# Patient Record
Sex: FEMALE | Race: WHITE | NOT HISPANIC OR LATINO | URBAN - METROPOLITAN AREA
[De-identification: names, ages, dates, MRNs, and addresses within clinical notes are randomized per-mention and may not be internally consistent; named-entity substitution may affect disease eponyms.]

---

## 2020-10-02 ENCOUNTER — EMERGENCY (EMERGENCY)
Facility: HOSPITAL | Age: 45
LOS: 0 days | Discharge: HOME | End: 2020-10-02
Payer: COMMERCIAL

## 2020-10-02 VITALS — OXYGEN SATURATION: 95 %

## 2020-10-02 VITALS
RESPIRATION RATE: 18 BRPM | HEART RATE: 89 BPM | TEMPERATURE: 99 F | OXYGEN SATURATION: 93 % | SYSTOLIC BLOOD PRESSURE: 146 MMHG | DIASTOLIC BLOOD PRESSURE: 88 MMHG

## 2020-10-02 DIAGNOSIS — R09.81 NASAL CONGESTION: ICD-10-CM

## 2020-10-02 DIAGNOSIS — R09.89 OTHER SPECIFIED SYMPTOMS AND SIGNS INVOLVING THE CIRCULATORY AND RESPIRATORY SYSTEMS: ICD-10-CM

## 2020-10-02 PROCEDURE — U0003: CPT

## 2020-10-02 PROCEDURE — 99283 EMERGENCY DEPT VISIT LOW MDM: CPT

## 2020-10-02 NOTE — ED STATDOCS - PHYSICAL EXAMINATION
Constitutional: NAD AAOx3. Nontoxic, well appearing. Speaking full sentences  w/o distress  Head: Normocephalic atraumatic  Mouth: no airway obstruction. uvula midline. No tonsilar erythema or hypertrophy w/o exudate   Cardiac: [id number]   Resp: Lungs CTA B/L  Abd: soft, nd. NTTP. No r/g/r.   Neuro: motor and sensory intact

## 2020-10-02 NOTE — ED STATDOCS - NS ED ROS FT
ROS:   Constitutional- -fever, -chills.    ENT- +rhinorrhea, no sore throat, + congestion.    Cardiac- no chest pain, no palpitations,   Respiratory- -cough, -SOB    Abdomen- No nausea, no vomiting, no diarrhea.    Urinary- no dysuria, no urgency, no frequency.    Skin- No rashes

## 2020-10-02 NOTE — ED STATDOCS - OBJECTIVE STATEMENT
Pt presents to ED with runny nose and congestion  FOR  2  days.   Pt concerned for possible COVID-19.   Pt here for testing.

## 2020-10-02 NOTE — ED STATDOCS - CLINICAL SUMMARY MEDICAL DECISION MAKING FREE TEXT BOX
patient presents with congestion and concern for COVID exposure.  As patient is nontoxic appearing will test for COVID and d/c.  Quarantine reviewed and return precautions reviewed.

## 2020-10-02 NOTE — ED STATDOCS - PATIENT PORTAL LINK FT
You can access the FollowMyHealth Patient Portal offered by [facility] by registering at the following website: [email or web address]. By joining [employer]’s FollowMyHealth portal, you will also be able to view your health information using other applications (apps) compatible with our system.

## 2020-10-03 LAB — SARS-COV-2 RNA SPEC QL NAA+PROBE: SIGNIFICANT CHANGE UP

## 2022-05-28 ENCOUNTER — NON-APPOINTMENT (OUTPATIENT)
Age: 47
End: 2022-05-28

## 2023-04-05 ENCOUNTER — NON-APPOINTMENT (OUTPATIENT)
Age: 48
End: 2023-04-05

## 2023-04-05 ENCOUNTER — APPOINTMENT (OUTPATIENT)
Dept: ELECTROPHYSIOLOGY | Facility: CLINIC | Age: 48
End: 2023-04-05
Payer: COMMERCIAL

## 2023-04-05 VITALS
RESPIRATION RATE: 14 BRPM | BODY MASS INDEX: 37.67 KG/M2 | SYSTOLIC BLOOD PRESSURE: 139 MMHG | HEIGHT: 67 IN | WEIGHT: 240 LBS | HEART RATE: 87 BPM | OXYGEN SATURATION: 96 % | DIASTOLIC BLOOD PRESSURE: 92 MMHG

## 2023-04-05 DIAGNOSIS — I10 ESSENTIAL (PRIMARY) HYPERTENSION: ICD-10-CM

## 2023-04-05 DIAGNOSIS — C83.30 DIFFUSE LARGE B-CELL LYMPHOMA, UNSPECIFIED SITE: ICD-10-CM

## 2023-04-05 DIAGNOSIS — Z78.9 OTHER SPECIFIED HEALTH STATUS: ICD-10-CM

## 2023-04-05 DIAGNOSIS — Z82.49 FAMILY HISTORY OF ISCHEMIC HEART DISEASE AND OTHER DISEASES OF THE CIRCULATORY SYSTEM: ICD-10-CM

## 2023-04-05 DIAGNOSIS — E03.9 HYPOTHYROIDISM, UNSPECIFIED: ICD-10-CM

## 2023-04-05 PROCEDURE — 93000 ELECTROCARDIOGRAM COMPLETE: CPT

## 2023-04-05 PROCEDURE — 99205 OFFICE O/P NEW HI 60 MIN: CPT | Mod: 25

## 2023-04-05 RX ORDER — RIVAROXABAN 20 MG/1
20 TABLET, FILM COATED ORAL
Qty: 90 | Refills: 1 | Status: ACTIVE | COMMUNITY

## 2023-04-05 RX ORDER — ROSUVASTATIN CALCIUM 5 MG/1
5 TABLET, FILM COATED ORAL
Qty: 90 | Refills: 3 | Status: ACTIVE | COMMUNITY

## 2023-04-05 RX ORDER — TIRZEPATIDE 2.5 MG/.5ML
2.5 INJECTION, SOLUTION SUBCUTANEOUS
Refills: 0 | Status: ACTIVE | COMMUNITY

## 2023-04-05 RX ORDER — MULTIVIT-MIN/IRON/FOLIC ACID/K 18-600-40
400 CAPSULE ORAL
Refills: 0 | Status: ACTIVE | COMMUNITY

## 2023-04-05 RX ORDER — LEVOTHYROXINE SODIUM 150 MCG
150 TABLET ORAL DAILY
Refills: 0 | Status: ACTIVE | COMMUNITY

## 2023-04-05 NOTE — DISCUSSION/SUMMARY
[FreeTextEntry1] : In summary this is a 48-year-old woman with [id number] 1 persistent atrial fibrillation. Options regarding management, including a rate control strategy with AV jennie blocking agents, or rhythm control strategies employing anti-arrhythmic drugs and/or catheter ablation were reviewed.  She remains symptomatic despite good rate control and we discussed transition to a rhythm control strategy which might include cardioversion with addition of an antiarrhythmic drug or return for catheter ablation.  She seems to minimize long-term drug therapy and would prefer a definitive approach with catheter ablation.\par \par The rationale for the procedure as well as its risks--including but not limited to bleeding, vascular injury, pericardial effusion/tamponade, heart block requiring pacemaker, stroke, and death--were reviewed in detail. After consideration of this information, the decision was made to proceed with the procedure.  She will undergo TTE prior to returning for procedure.\par \par She appeared to understand the whole discussion and verbalized that all of her questions were answered to her satisfaction.\par \par Thank you for allowing me to be involved in the care of this pleasant woman. Please feel free to contact me with any questions.\par \par \par Tam Auguste MD\par  of Cardiology\par Electrophysiology Section\par [address] [place] [address], [address] [place] [address]\[place] [address]\[facility] Office: [phone]\par Fax: [phone]\par \par \par  [EKG obtained to assist in diagnosis and management of assessed problem(s)] : EKG obtained to assist in diagnosis and management of assessed problem(s)

## 2023-04-05 NOTE — HISTORY OF PRESENT ILLNESS
English [FreeTextEntry1] : Ms. Ana Gómez was seen today in the cardiac electrophysiology clinic.  She is a 48-year-old woman with a history of obesity and [id number] 1 persistent atrial fibrillation.  She was initially diagnosed with atrial fibrillation almost 5 years ago which at that time was paroxysmal in nature.  Symptoms have been increasing in frequency and duration over recent years until approximately January when she noted onset of durable arrhythmia.  She was seen by her physician with up titration of her beta-blocker and adequate rate control.  However on Toprol 100 mg daily she continues to experience intermittent palpitations and has noted an increase in overall fatigue.\par \par She has no chest pain, shortness of breath, dizziness or syncope.

## 2023-04-05 NOTE — PHYSICAL EXAM

## 2023-04-26 ENCOUNTER — APPOINTMENT (OUTPATIENT)
Dept: CARDIOLOGY | Facility: CLINIC | Age: 48
End: 2023-04-26
Payer: COMMERCIAL

## 2023-04-26 PROCEDURE — 93306 TTE W/DOPPLER COMPLETE: CPT

## 2023-05-02 ENCOUNTER — NON-APPOINTMENT (OUTPATIENT)
Age: 48
End: 2023-05-02

## 2023-05-11 ENCOUNTER — OUTPATIENT (OUTPATIENT)
Dept: INPATIENT UNIT | Facility: HOSPITAL | Age: 48
LOS: 1 days | End: 2023-05-11
Payer: COMMERCIAL

## 2023-05-11 ENCOUNTER — TRANSCRIPTION ENCOUNTER (OUTPATIENT)
Age: 48
End: 2023-05-11

## 2023-05-11 VITALS
WEIGHT: 235.01 LBS | TEMPERATURE: 98 F | HEIGHT: 67 IN | RESPIRATION RATE: 16 BRPM | HEART RATE: 107 BPM | SYSTOLIC BLOOD PRESSURE: 149 MMHG | DIASTOLIC BLOOD PRESSURE: 101 MMHG | OXYGEN SATURATION: 99 %

## 2023-05-11 DIAGNOSIS — I48.91 UNSPECIFIED ATRIAL FIBRILLATION: ICD-10-CM

## 2023-05-11 DIAGNOSIS — Z98.891 HISTORY OF UTERINE SCAR FROM PREVIOUS SURGERY: Chronic | ICD-10-CM

## 2023-05-11 DIAGNOSIS — Z98.890 OTHER SPECIFIED POSTPROCEDURAL STATES: Chronic | ICD-10-CM

## 2023-05-11 DIAGNOSIS — Z98.51 TUBAL LIGATION STATUS: Chronic | ICD-10-CM

## 2023-05-11 LAB
ANION GAP SERPL CALC-SCNC: 16 MMOL/L — SIGNIFICANT CHANGE UP (ref 5–17)
BLD GP AB SCN SERPL QL: NEGATIVE — SIGNIFICANT CHANGE UP
BUN SERPL-MCNC: 11 MG/DL — SIGNIFICANT CHANGE UP (ref 7–23)
CALCIUM SERPL-MCNC: 9.8 MG/DL — SIGNIFICANT CHANGE UP (ref 8.4–10.5)
CHLORIDE SERPL-SCNC: 101 MMOL/L — SIGNIFICANT CHANGE UP (ref 96–108)
CO2 SERPL-SCNC: 22 MMOL/L — SIGNIFICANT CHANGE UP (ref 22–31)
CREAT SERPL-MCNC: 0.72 MG/DL — SIGNIFICANT CHANGE UP (ref 0.5–1.3)
EGFR: 104 ML/MIN/1.73M2 — SIGNIFICANT CHANGE UP
GLUCOSE SERPL-MCNC: 102 MG/DL — HIGH (ref 70–99)
HCT VFR BLD CALC: 46.3 % — HIGH (ref 34.5–45)
HGB BLD-MCNC: 15.6 G/DL — HIGH (ref 11.5–15.5)
MCHC RBC-ENTMCNC: 32 PG — SIGNIFICANT CHANGE UP (ref 27–34)
MCHC RBC-ENTMCNC: 33.7 GM/DL — SIGNIFICANT CHANGE UP (ref 32–36)
MCV RBC AUTO: 94.9 FL — SIGNIFICANT CHANGE UP (ref 80–100)
NRBC # BLD: 0 /100 WBCS — SIGNIFICANT CHANGE UP (ref 0–0)
PLATELET # BLD AUTO: 288 K/UL — SIGNIFICANT CHANGE UP (ref 150–400)
POTASSIUM SERPL-MCNC: 4.3 MMOL/L — SIGNIFICANT CHANGE UP (ref 3.5–5.3)
POTASSIUM SERPL-SCNC: 4.3 MMOL/L — SIGNIFICANT CHANGE UP (ref 3.5–5.3)
RBC # BLD: 4.88 M/UL — SIGNIFICANT CHANGE UP (ref 3.8–5.2)
RBC # FLD: 12.9 % — SIGNIFICANT CHANGE UP (ref 10.3–14.5)
RH IG SCN BLD-IMP: POSITIVE — SIGNIFICANT CHANGE UP
SODIUM SERPL-SCNC: 139 MMOL/L — SIGNIFICANT CHANGE UP (ref 135–145)
WBC # BLD: 10 K/UL — SIGNIFICANT CHANGE UP (ref 3.8–10.5)
WBC # FLD AUTO: 10 K/UL — SIGNIFICANT CHANGE UP (ref 3.8–10.5)

## 2023-05-11 PROCEDURE — 93656 COMPRE EP EVAL ABLTJ ATR FIB: CPT

## 2023-05-11 PROCEDURE — 92960 CARDIOVERSION ELECTRIC EXT: CPT | Mod: 59

## 2023-05-11 PROCEDURE — 93623 PRGRMD STIMJ&PACG IV RX NFS: CPT | Mod: 26

## 2023-05-11 PROCEDURE — 93657 TX L/R ATRIAL FIB ADDL: CPT

## 2023-05-11 RX ORDER — ACETAMINOPHEN 500 MG
1000 TABLET ORAL ONCE
Refills: 0 | Status: COMPLETED | OUTPATIENT
Start: 2023-05-11 | End: 2023-05-11

## 2023-05-11 RX ORDER — ATORVASTATIN CALCIUM 80 MG/1
20 TABLET, FILM COATED ORAL AT BEDTIME
Refills: 0 | Status: DISCONTINUED | OUTPATIENT
Start: 2023-05-11 | End: 2023-05-12

## 2023-05-11 RX ORDER — METOPROLOL TARTRATE 50 MG
100 TABLET ORAL DAILY
Refills: 0 | Status: DISCONTINUED | OUTPATIENT
Start: 2023-05-11 | End: 2023-05-12

## 2023-05-11 RX ORDER — RIVAROXABAN 15 MG-20MG
20 KIT ORAL
Refills: 0 | Status: DISCONTINUED | OUTPATIENT
Start: 2023-05-11 | End: 2023-05-12

## 2023-05-11 RX ORDER — PANTOPRAZOLE SODIUM 20 MG/1
40 TABLET, DELAYED RELEASE ORAL
Refills: 0 | Status: DISCONTINUED | OUTPATIENT
Start: 2023-05-11 | End: 2023-05-12

## 2023-05-11 RX ORDER — BENZOCAINE AND MENTHOL 5; 1 G/100ML; G/100ML
1 LIQUID ORAL EVERY 4 HOURS
Refills: 0 | Status: DISCONTINUED | OUTPATIENT
Start: 2023-05-11 | End: 2023-05-12

## 2023-05-11 RX ORDER — RIVAROXABAN 15 MG-20MG
20 KIT ORAL
Refills: 0 | Status: DISCONTINUED | OUTPATIENT
Start: 2023-05-11 | End: 2023-05-11

## 2023-05-11 RX ORDER — PANTOPRAZOLE SODIUM 20 MG/1
1 TABLET, DELAYED RELEASE ORAL
Qty: 60 | Refills: 0
Start: 2023-05-11 | End: 2023-06-09

## 2023-05-11 RX ADMIN — Medication 1000 MILLIGRAM(S): at 19:00

## 2023-05-11 RX ADMIN — PANTOPRAZOLE SODIUM 40 MILLIGRAM(S): 20 TABLET, DELAYED RELEASE ORAL at 20:45

## 2023-05-11 RX ADMIN — Medication 100 MILLIGRAM(S): at 20:45

## 2023-05-11 RX ADMIN — RIVAROXABAN 20 MILLIGRAM(S): KIT at 20:45

## 2023-05-11 RX ADMIN — Medication 400 MILLIGRAM(S): at 16:55

## 2023-05-11 RX ADMIN — BENZOCAINE AND MENTHOL 1 LOZENGE: 5; 1 LIQUID ORAL at 20:46

## 2023-05-11 NOTE — H&P CARDIOLOGY - NSICDXFAMILYHX_GEN_ALL_CORE_FT
FAMILY HISTORY:  Mother  Still living? Unknown  Family history of atrial fibrillation, Age at diagnosis: Age Unknown    Sibling  Still living? Unknown  Family history of atrial fibrillation, Age at diagnosis: Age Unknown    Aunt  Still living? Yes, Estimated age: Age Unknown  Family history of atrial fibrillation, Age at diagnosis: Age Unknown

## 2023-05-11 NOTE — PRE-ANESTHESIA EVALUATION ADULT - NSANTHOSAYNRD_GEN_A_CORE
No. BERNARDA screening performed.  STOP BANG Legend: 0-2 = LOW Risk; 3-4 = INTERMEDIATE Risk; 5-8 = HIGH Risk

## 2023-05-11 NOTE — ASU DISCHARGE PLAN (ADULT/PEDIATRIC) - ASU DC SPECIAL INSTRUCTIONSFT
WOUND CARE:  The day AFTER your procedure  - Remove the bandage at the site GENTLY, clean with mild soap and water, and pat dry; leave open to air  - You may shower   - DO NOT apply lotions, creams, ointments, powder, perfumes to your incision site  - Check your groin every day. A small amount of bruising or soreness is normal, a bump (smaller than nickel) might be present which is normal  - DO NOT SOAK your site for 1 week (no baths, no pools, no tubs, etc..)    ACTIVITY:  Your procedure was done through your groin  For the next 7 DAYS:  - Limit climbing stairs, no strenuous activity, pushing , pulling, or straining (especially during bowel movements)  - DO NOT LIFT anything 10 lbs or heavier   - You may resume sexual activity in 7 days, unless instructed otherwise    Mild palpitations are normal     Follow heart healthy diet recommended by your doctor, , if you smoke STOP SMOKING (may call [phone] for center of tobacco control if you need assistance).  For the next 24 hours:   - Stay at home and rest, do not drive or operate heavy machinery   Do not drink alcoholic beverages   Do not make important personal or business decisions     ***CALL YOUR DOCTOR ***  IF you have fever, chills, body aches, or severe pain, swelling, redness, heat, yellow drainage from your incision site  IF bleeding or significant new swelling from your puncture site  IF you experience rapid heartbeat or palpitations that cause: lightheadedness, dizziness, or fainting spell.  IF you experience difficulty swallowing, or pain with swallowing   IF unable to get in contact with your doctor, you may call the Cardiology Office at [facility] at [phone]

## 2023-05-11 NOTE — CHART NOTE - NSCHARTNOTEFT_GEN_A_CORE
FEDERICO ANGIE  [id number]    PROCEDURE:  Afib ablation    INDICATION:  Persistent atrial fibrillation    ELECTROPHYSIOLOGIST(S):  MD Cristhian Joyce MD (fellow)    ANESTHESIOLOGY:  GA    FINDINGS:  Successful PVI  Incessant spontaneous initiation of afib with APC with earliest signal at CS 5,6 at the posterior aspect of the mitral valve despite multiple DCCV  Ibutilide 0.5mg given with resumption of sinus rhythm  CTI ablation  Posterior wall isolation    COMPLICATIONS:  None    RECOMMENDATIONS:  Xarelto 9pm tonight  Bedrest 3 hours  Protonix bid x30 days  Resume home meds

## 2023-05-11 NOTE — H&P CARDIOLOGY - NSICDXPASTMEDICALHX_GEN_ALL_CORE_FT
PAST MEDICAL HISTORY:  Afib     HLD (hyperlipidemia)     HTN (hypertension)     Non Hodgkin's lymphoma

## 2023-05-11 NOTE — H&P CARDIOLOGY - HISTORY OF PRESENT ILLNESS
This is a 47y/o  female with no known implantable devices noted with Covid 19 negative Vaccinated with  PMHX of HTN , HLD, Hypothryoidism, . PT presents today for AFIB Ablation with Dr. Auguste. Currently CP free no sob, no palpitations no lightheadedness noted.    This is a 49y/o  female with no known implantable devices noted with Covid 19 negative Vaccinated with Pfizer x 2 no booster with  PMHX of HTN , HLD, Obesity , Afib consistent with palpitations for 3 months on Xarelto last dose on 5/10/23 1400 ,  Hypothryoidism, Hodgkins Lymphoma with stem cell transplant 2004 treated with radiation and chemo ( pt with chronic congested cough)  . PT presents today for AFIB Ablation with Dr. Auguste. Currently CP free no sob, no palpitations no lightheadedness noted.   LMP 2 years ago and had Tubal ligation .   Cardiologist Tam Lopez .     This is a 47y/o  female with no known implantable devices noted with Covid 19 negative Vaccinated with Pfizer x 2 no booster with  PMHX of HTN , HLD, Obesity , Afib consistent with palpitations for 3 months on Xarelto last dose on 5/10/23 1400 pt admits to missing some doses over last several weeks , Hypothryoidism, Hodgkins Lymphoma with stem cell transplant 2004 treated with radiation and chemo ( pt with chronic congested cough from radiation therapy )  . PT presents today for AFIB Ablation with Dr. Auguste. Currently CP free no sob, no palpitations no lightheadedness noted.   LMP 2 years ago and had Tubal ligation .   Cardiologist Tam Lopez .

## 2023-05-12 ENCOUNTER — TRANSCRIPTION ENCOUNTER (OUTPATIENT)
Age: 48
End: 2023-05-12

## 2023-05-12 VITALS
HEART RATE: 85 BPM | SYSTOLIC BLOOD PRESSURE: 134 MMHG | TEMPERATURE: 98 F | OXYGEN SATURATION: 96 % | RESPIRATION RATE: 19 BRPM | DIASTOLIC BLOOD PRESSURE: 87 MMHG

## 2023-05-12 LAB
ANION GAP SERPL CALC-SCNC: 13 MMOL/L — SIGNIFICANT CHANGE UP (ref 5–17)
BUN SERPL-MCNC: 8 MG/DL — SIGNIFICANT CHANGE UP (ref 7–23)
CALCIUM SERPL-MCNC: 8.7 MG/DL — SIGNIFICANT CHANGE UP (ref 8.4–10.5)
CHLORIDE SERPL-SCNC: 103 MMOL/L — SIGNIFICANT CHANGE UP (ref 96–108)
CO2 SERPL-SCNC: 21 MMOL/L — LOW (ref 22–31)
CREAT SERPL-MCNC: 0.55 MG/DL — SIGNIFICANT CHANGE UP (ref 0.5–1.3)
EGFR: 114 ML/MIN/1.73M2 — SIGNIFICANT CHANGE UP
GLUCOSE SERPL-MCNC: 128 MG/DL — HIGH (ref 70–99)
HCT VFR BLD CALC: 40.2 % — SIGNIFICANT CHANGE UP (ref 34.5–45)
HGB BLD-MCNC: 13.4 G/DL — SIGNIFICANT CHANGE UP (ref 11.5–15.5)
MCHC RBC-ENTMCNC: 31.5 PG — SIGNIFICANT CHANGE UP (ref 27–34)
MCHC RBC-ENTMCNC: 33.3 GM/DL — SIGNIFICANT CHANGE UP (ref 32–36)
MCV RBC AUTO: 94.4 FL — SIGNIFICANT CHANGE UP (ref 80–100)
NRBC # BLD: 0 /100 WBCS — SIGNIFICANT CHANGE UP (ref 0–0)
PLATELET # BLD AUTO: 253 K/UL — SIGNIFICANT CHANGE UP (ref 150–400)
POTASSIUM SERPL-MCNC: 4 MMOL/L — SIGNIFICANT CHANGE UP (ref 3.5–5.3)
POTASSIUM SERPL-SCNC: 4 MMOL/L — SIGNIFICANT CHANGE UP (ref 3.5–5.3)
RBC # BLD: 4.26 M/UL — SIGNIFICANT CHANGE UP (ref 3.8–5.2)
RBC # FLD: 12.9 % — SIGNIFICANT CHANGE UP (ref 10.3–14.5)
SODIUM SERPL-SCNC: 137 MMOL/L — SIGNIFICANT CHANGE UP (ref 135–145)
WBC # BLD: 8.48 K/UL — SIGNIFICANT CHANGE UP (ref 3.8–10.5)
WBC # FLD AUTO: 8.48 K/UL — SIGNIFICANT CHANGE UP (ref 3.8–10.5)

## 2023-05-12 PROCEDURE — 93656 COMPRE EP EVAL ABLTJ ATR FIB: CPT

## 2023-05-12 PROCEDURE — 92960 CARDIOVERSION ELECTRIC EXT: CPT | Mod: 59

## 2023-05-12 PROCEDURE — 86900 BLOOD TYPING SEROLOGIC ABO: CPT

## 2023-05-12 PROCEDURE — C1760: CPT

## 2023-05-12 PROCEDURE — 93312 ECHO TRANSESOPHAGEAL: CPT

## 2023-05-12 PROCEDURE — 80048 BASIC METABOLIC PNL TOTAL CA: CPT

## 2023-05-12 PROCEDURE — C1732: CPT

## 2023-05-12 PROCEDURE — 93320 DOPPLER ECHO COMPLETE: CPT

## 2023-05-12 PROCEDURE — 86850 RBC ANTIBODY SCREEN: CPT

## 2023-05-12 PROCEDURE — 36415 COLL VENOUS BLD VENIPUNCTURE: CPT

## 2023-05-12 PROCEDURE — C1730: CPT

## 2023-05-12 PROCEDURE — 93623 PRGRMD STIMJ&PACG IV RX NFS: CPT

## 2023-05-12 PROCEDURE — 93005 ELECTROCARDIOGRAM TRACING: CPT

## 2023-05-12 PROCEDURE — 86901 BLOOD TYPING SEROLOGIC RH(D): CPT

## 2023-05-12 PROCEDURE — C1894: CPT

## 2023-05-12 PROCEDURE — 93657 TX L/R ATRIAL FIB ADDL: CPT

## 2023-05-12 PROCEDURE — 85027 COMPLETE CBC AUTOMATED: CPT

## 2023-05-12 PROCEDURE — 93325 DOPPLER ECHO COLOR FLOW MAPG: CPT

## 2023-05-12 PROCEDURE — 93356 MYOCRD STRAIN IMG SPCKL TRCK: CPT

## 2023-05-12 PROCEDURE — 76376 3D RENDER W/INTRP POSTPROCES: CPT

## 2023-05-12 PROCEDURE — C1889: CPT

## 2023-05-12 PROCEDURE — C1766: CPT

## 2023-05-12 PROCEDURE — C1759: CPT

## 2023-05-12 RX ORDER — BENZOCAINE AND MENTHOL 5; 1 G/100ML; G/100ML
1 LIQUID ORAL
Qty: 0 | Refills: 0 | DISCHARGE
Start: 2023-05-12

## 2023-05-12 RX ADMIN — PANTOPRAZOLE SODIUM 40 MILLIGRAM(S): 20 TABLET, DELAYED RELEASE ORAL at 04:58

## 2023-05-12 RX ADMIN — ATORVASTATIN CALCIUM 20 MILLIGRAM(S): 80 TABLET, FILM COATED ORAL at 00:37

## 2023-05-12 NOTE — DISCHARGE NOTE PROVIDER - NSDCMRMEDTOKEN_GEN_ALL_CORE_FT
Caltrate 600 mg oral tablet: 2 tab(s) orally once a day  Crestor 5 mg oral tablet: 1 tab(s) orally once a day  D3 25 mcg (1000 intl units) oral tablet: 1 orally once a day  menthol-benzocaine topical: 1 lozenge sublingual every 4 hours (5 times/day) as needed for sore throat  Metoprolol Succinate  mg oral tablet, extended release: 1 tab(s) orally once a day  Mounjaro 2.5 mg/0.5 mL subcutaneous solution: 2.5 milligram(s) subcutaneously once a week  Protonix 40 mg oral delayed release tablet: 1 tab(s) orally 2 times a day  Synthroid 150 mcg (0.15 mg) oral tablet: 1 tab(s) orally once a day  Xarelto 20 mg oral tablet: 1 tab(s) orally once a day

## 2023-05-12 NOTE — DISCHARGE NOTE PROVIDER - NSDCCPTREATMENT_GEN_ALL_CORE_FT
PRINCIPAL PROCEDURE  Procedure: Intracardiac catheter ablation for atrial fibrillation  Findings and Treatment: Atrial fib ablation via right femoral vein access

## 2023-05-12 NOTE — DISCHARGE NOTE NURSING/CASE MANAGEMENT/SOCIAL WORK - PATIENT PORTAL LINK FT
You can access the FollowMyHealth Patient Portal offered by [facility] by registering at the following website: [email or web address]. By joining [employer]’s FollowMyHealth portal, you will also be able to view your health information using other applications (apps) compatible with our system.

## 2023-05-12 NOTE — DISCHARGE NOTE PROVIDER - HOSPITAL COURSE
HPI:  This is a 47y/o  female with no known implantable devices noted with Covid 19 negative Vaccinated with Pfizer x 2 no booster with  PMHX of HTN , HLD, Obesity , Afib consistent with palpitations for 3 months on Xarelto last dose on 5/10/23 1400 pt admits to missing some doses over last several weeks , Hypothryoidism, Hodgkins Lymphoma with stem cell transplant 2004 treated with radiation and chemo ( pt with chronic congested cough from radiation therapy )  . PT presents today for AFIB Ablation with Dr. Auguste. Currently CP free no sob, no palpitations no lightheadedness noted.   LMP 2 years ago and had Tubal ligation .   Cardiologist Tam Lopez .    5/11 s/p atrial fib ablation. Right femoral vein site without swelling, bleeding.   HPI:  This is a 49y/o  female with no known implantable devices noted with Covid 19 negative Vaccinated with Pfizer x 2 no booster with  PMHX of HTN , HLD, Obesity , Afib consistent with palpitations for 3 months on Xarelto last dose on 5/10/23 1400 pt admits to missing some doses over last several weeks , Hypothryoidism, Hodgkins Lymphoma with stem cell transplant 2004 treated with radiation and chemo ( pt with chronic congested cough from radiation therapy )  . PT presents today for AFIB Ablation with Dr. Auguste. Currently CP free no sob, no palpitations no lightheadedness noted.   LMP 2 years ago and had Tubal ligation .   Cardiologist Tam Lopez .    5/11 s/p atrial fib ablation. Right femoral vein site without swelling, bleeding.  5/12 right femoral site with no s/s of bleeding or swelling.

## 2023-05-12 NOTE — DISCHARGE NOTE PROVIDER - CARE PROVIDERS DIRECT ADDRESSES
[email or web address]

## 2023-05-12 NOTE — DISCHARGE NOTE PROVIDER - CARE PROVIDER_API CALL
Tam Auguste)  Cardiac Electrophysiology; Cardiology  [address] [place] [address]  Phone: [phone]  Fax: ()-  Scheduled Appointment: 06/28/2023   Tam Auguste)  Cardiac Electrophysiology; Cardiology  [address] [place] [address]  Phone: [phone]  Fax: ()-  Scheduled Appointment: 06/28/2023 09:15 AM

## 2023-05-12 NOTE — DISCHARGE NOTE PROVIDER - PROVIDER TOKENS
PROVIDER:[TOKEN:[[id number]],SCHEDULEDAPPT:[06/28/2023]] PROVIDER:[TOKEN:[[id number]],SCHEDULEDAPPT:[06/28/2023],SCHEDULEDAPPTTIME:[09:15 AM]]

## 2023-05-12 NOTE — DISCHARGE NOTE PROVIDER - NSDCCPCAREPLAN_GEN_ALL_CORE_FT
PRINCIPAL DISCHARGE DIAGNOSIS  Diagnosis: Atrial fibrillation  Assessment and Plan of Treatment: Continue with your cardiologist and primary care MD. Continue your current medications. Call your physician for palpitations, feelings of rapid heart beat, lightheadedness, or dizziness. Continue Xarelto as prescribed.      SECONDARY DISCHARGE DIAGNOSES  Diagnosis: HTN (hypertension)  Assessment and Plan of Treatment: Continue with your blood pressure medications; eat a heart healthy diet with low salt diet; exercise regularly (consult with your physician or cardiologist first); maintain a heart healthy weight; if you smoke - quit (A resource to help you stop smoking is the [facility] [employer] for Tobacco Control – phone number [phone].); include healthy ways to manage stress. Continue to follow with your primary care physician or cardiologist.    Diagnosis: HLD (hyperlipidemia)  Assessment and Plan of Treatment: Continue with your cholesterol medications. Eat a heart healthy diet that is low in saturated fats and salt, and includes whole grains, fruits, vegetables and lean protein; exercise regularly (consult with your physician or cardiologist first); maintain a heart healthy weight; if you smoke - quit (A resource to help you stop smoking is the [facility] for Tobacco Control – phone number [phone].). Continue to follow with your primary physician or cardiologist.

## 2023-06-01 PROBLEM — E78.5 HYPERLIPIDEMIA, UNSPECIFIED: Chronic | Status: ACTIVE | Noted: 2023-05-11

## 2023-06-01 PROBLEM — C85.90 NON-HODGKIN LYMPHOMA, UNSPECIFIED, UNSPECIFIED SITE: Chronic | Status: ACTIVE | Noted: 2023-05-11

## 2023-06-01 PROBLEM — I48.91 UNSPECIFIED ATRIAL FIBRILLATION: Chronic | Status: ACTIVE | Noted: 2023-05-11

## 2023-06-01 PROBLEM — I10 ESSENTIAL (PRIMARY) HYPERTENSION: Chronic | Status: ACTIVE | Noted: 2023-05-11

## 2023-06-28 ENCOUNTER — NON-APPOINTMENT (OUTPATIENT)
Age: 48
End: 2023-06-28

## 2023-06-28 ENCOUNTER — APPOINTMENT (OUTPATIENT)
Dept: ELECTROPHYSIOLOGY | Facility: CLINIC | Age: 48
End: 2023-06-28
Payer: COMMERCIAL

## 2023-06-28 VITALS — DIASTOLIC BLOOD PRESSURE: 90 MMHG | HEART RATE: 81 BPM | SYSTOLIC BLOOD PRESSURE: 143 MMHG | OXYGEN SATURATION: 96 %

## 2023-06-28 DIAGNOSIS — I48.19 OTHER PERSISTENT ATRIAL FIBRILLATION: ICD-10-CM

## 2023-06-28 PROCEDURE — 93000 ELECTROCARDIOGRAM COMPLETE: CPT

## 2023-06-28 PROCEDURE — 99214 OFFICE O/P EST MOD 30 MIN: CPT | Mod: 25

## 2023-06-28 NOTE — PHYSICAL EXAM

## 2023-06-28 NOTE — DISCUSSION/SUMMARY
[FreeTextEntry1] : In summary this is a 48-year-old woman with [id number] 1 persistent atrial fibrillation now s/p ablation.  She had ERAF 6 days post ablation but spontaneously converted on flecainide and is now maintained sinus rhythm.  She will continue flecainide until the end of the blanking period, after which she will be sent an event monitor to assess for spontaneous recurrence of arrhythmia.  She will follow-up in 6 months.\par \par She appeared to understand the whole discussion and verbalized that all of her questions were answered to her satisfaction.\par \par Thank you for allowing me to be involved in the care of this pleasant woman. Please feel free to contact me with any questions.\par \par \par Tam Auguste MD\[facility]  of Cardiology\par Electrophysiology Section\[address], [address] [place] [address]\[facility] Office: [phone]\par Fax: [phone]\par \par \[facility]  [EKG obtained to assist in diagnosis and management of assessed problem(s)] : EKG obtained to assist in diagnosis and management of assessed problem(s)

## 2023-06-28 NOTE — HISTORY OF PRESENT ILLNESS
[FreeTextEntry1] : Ms. Ana Gómez was seen today in the cardiac electrophysiology clinic.  She is a 48-year-old woman with a history of obesity and [id number]-HNTw 1 persistent atrial fibrillation.  On 5/11/2023 she returned and underwent atrial fibrillation ablation.  She remained easily and spontaneously inducible for atrial fibrillation following pulmonary vein isolation so posterior wall isolation was also performed.  She continued to have spontaneous atrial fibrillation with mapping suggesting a trigger APC, likely from the vein of [place].  As it was unclear if this were simply iatrogenic.  We decided to conclude the case and follow-up for clinical recurrence.  6 days after discharge she had recurrence of atrial fibrillation and was started on flecainide with spontaneous termination of arrhythmia back to sinus.  She presents now for follow-up noting no further palpitations.  She further denies any incidence of chest pain, shortness of breath, dizziness, syncope.  She has no fever or dysphagia.

## 2023-09-27 ENCOUNTER — RX RENEWAL (OUTPATIENT)
Age: 48
End: 2023-09-27

## 2023-09-27 RX ORDER — FLECAINIDE ACETATE 50 MG/1
50 TABLET ORAL
Qty: 60 | Refills: 3 | Status: DISCONTINUED | COMMUNITY
Start: 2023-05-31 | End: 2023-09-27

## 2023-12-10 ENCOUNTER — TRANSCRIPTION ENCOUNTER (OUTPATIENT)
Age: 48
End: 2023-12-10

## 2023-12-10 ENCOUNTER — INPATIENT (INPATIENT)
Facility: HOSPITAL | Age: 48
LOS: 2 days | Discharge: HOME | DRG: 908 | End: 2023-12-13
Attending: SURGERY | Admitting: SURGERY
Payer: COMMERCIAL

## 2023-12-10 VITALS
TEMPERATURE: 98 F | OXYGEN SATURATION: 100 % | RESPIRATION RATE: 16 BRPM | DIASTOLIC BLOOD PRESSURE: 84 MMHG | SYSTOLIC BLOOD PRESSURE: 137 MMHG | HEART RATE: 98 BPM

## 2023-12-10 DIAGNOSIS — Z98.51 TUBAL LIGATION STATUS: Chronic | ICD-10-CM

## 2023-12-10 DIAGNOSIS — Z98.891 HISTORY OF UTERINE SCAR FROM PREVIOUS SURGERY: Chronic | ICD-10-CM

## 2023-12-10 DIAGNOSIS — Z98.890 OTHER SPECIFIED POSTPROCEDURAL STATES: Chronic | ICD-10-CM

## 2023-12-10 DIAGNOSIS — Z78.9 OTHER SPECIFIED HEALTH STATUS: ICD-10-CM

## 2023-12-10 LAB
ADD ON TEST-SPECIMEN IN LAB: SIGNIFICANT CHANGE UP
ADD ON TEST-SPECIMEN IN LAB: SIGNIFICANT CHANGE UP
ALBUMIN SERPL ELPH-MCNC: 3 G/DL — LOW (ref 3.3–5)
ALBUMIN SERPL ELPH-MCNC: 3 G/DL — LOW (ref 3.3–5)
ALP SERPL-CCNC: 111 U/L — SIGNIFICANT CHANGE UP (ref 40–120)
ALP SERPL-CCNC: 111 U/L — SIGNIFICANT CHANGE UP (ref 40–120)
ALT FLD-CCNC: 44 U/L — SIGNIFICANT CHANGE UP (ref 12–78)
ALT FLD-CCNC: 44 U/L — SIGNIFICANT CHANGE UP (ref 12–78)
ANION GAP SERPL CALC-SCNC: 7 MMOL/L — SIGNIFICANT CHANGE UP (ref 5–17)
ANION GAP SERPL CALC-SCNC: 7 MMOL/L — SIGNIFICANT CHANGE UP (ref 5–17)
APTT BLD: 39.9 SEC — HIGH (ref 24.5–35.6)
APTT BLD: 39.9 SEC — HIGH (ref 24.5–35.6)
AST SERPL-CCNC: 18 U/L — SIGNIFICANT CHANGE UP (ref 15–37)
AST SERPL-CCNC: 18 U/L — SIGNIFICANT CHANGE UP (ref 15–37)
BASOPHILS # BLD AUTO: 0.08 K/UL — SIGNIFICANT CHANGE UP (ref 0–0.2)
BASOPHILS # BLD AUTO: 0.08 K/UL — SIGNIFICANT CHANGE UP (ref 0–0.2)
BASOPHILS NFR BLD AUTO: 0.6 % — SIGNIFICANT CHANGE UP (ref 0–2)
BASOPHILS NFR BLD AUTO: 0.6 % — SIGNIFICANT CHANGE UP (ref 0–2)
BILIRUB SERPL-MCNC: 0.2 MG/DL — SIGNIFICANT CHANGE UP (ref 0.2–1.2)
BILIRUB SERPL-MCNC: 0.2 MG/DL — SIGNIFICANT CHANGE UP (ref 0.2–1.2)
BLD GP AB SCN SERPL QL: SIGNIFICANT CHANGE UP
BLD GP AB SCN SERPL QL: SIGNIFICANT CHANGE UP
BUN SERPL-MCNC: 8 MG/DL — SIGNIFICANT CHANGE UP (ref 7–23)
BUN SERPL-MCNC: 8 MG/DL — SIGNIFICANT CHANGE UP (ref 7–23)
CALCIUM SERPL-MCNC: 8.6 MG/DL — SIGNIFICANT CHANGE UP (ref 8.5–10.1)
CALCIUM SERPL-MCNC: 8.6 MG/DL — SIGNIFICANT CHANGE UP (ref 8.5–10.1)
CHLORIDE SERPL-SCNC: 110 MMOL/L — HIGH (ref 96–108)
CHLORIDE SERPL-SCNC: 110 MMOL/L — HIGH (ref 96–108)
CO2 SERPL-SCNC: 25 MMOL/L — SIGNIFICANT CHANGE UP (ref 22–31)
CO2 SERPL-SCNC: 25 MMOL/L — SIGNIFICANT CHANGE UP (ref 22–31)
CREAT SERPL-MCNC: 0.63 MG/DL — SIGNIFICANT CHANGE UP (ref 0.5–1.3)
CREAT SERPL-MCNC: 0.63 MG/DL — SIGNIFICANT CHANGE UP (ref 0.5–1.3)
EGFR: 110 ML/MIN/1.73M2 — SIGNIFICANT CHANGE UP
EGFR: 110 ML/MIN/1.73M2 — SIGNIFICANT CHANGE UP
EOSINOPHIL # BLD AUTO: 0.59 K/UL — HIGH (ref 0–0.5)
EOSINOPHIL # BLD AUTO: 0.59 K/UL — HIGH (ref 0–0.5)
EOSINOPHIL NFR BLD AUTO: 4.6 % — SIGNIFICANT CHANGE UP (ref 0–6)
EOSINOPHIL NFR BLD AUTO: 4.6 % — SIGNIFICANT CHANGE UP (ref 0–6)
GLUCOSE SERPL-MCNC: 165 MG/DL — HIGH (ref 70–99)
GLUCOSE SERPL-MCNC: 165 MG/DL — HIGH (ref 70–99)
HCG SERPL-ACNC: 3 MIU/ML — SIGNIFICANT CHANGE UP
HCG SERPL-ACNC: 3 MIU/ML — SIGNIFICANT CHANGE UP
HCT VFR BLD CALC: 23.8 % — LOW (ref 34.5–45)
HCT VFR BLD CALC: 23.8 % — LOW (ref 34.5–45)
HCT VFR BLD CALC: 26 % — LOW (ref 34.5–45)
HCT VFR BLD CALC: 26 % — LOW (ref 34.5–45)
HCT VFR BLD CALC: 35.6 % — SIGNIFICANT CHANGE UP (ref 34.5–45)
HCT VFR BLD CALC: 35.6 % — SIGNIFICANT CHANGE UP (ref 34.5–45)
HGB BLD-MCNC: 12 G/DL — SIGNIFICANT CHANGE UP (ref 11.5–15.5)
HGB BLD-MCNC: 12 G/DL — SIGNIFICANT CHANGE UP (ref 11.5–15.5)
HGB BLD-MCNC: 7.8 G/DL — LOW (ref 11.5–15.5)
HGB BLD-MCNC: 7.8 G/DL — LOW (ref 11.5–15.5)
HGB BLD-MCNC: 8.7 G/DL — LOW (ref 11.5–15.5)
HGB BLD-MCNC: 8.7 G/DL — LOW (ref 11.5–15.5)
IMM GRANULOCYTES NFR BLD AUTO: 1.9 % — HIGH (ref 0–0.9)
IMM GRANULOCYTES NFR BLD AUTO: 1.9 % — HIGH (ref 0–0.9)
INR BLD: 1.59 RATIO — HIGH (ref 0.85–1.18)
INR BLD: 1.59 RATIO — HIGH (ref 0.85–1.18)
LYMPHOCYTES # BLD AUTO: 34 % — SIGNIFICANT CHANGE UP (ref 13–44)
LYMPHOCYTES # BLD AUTO: 34 % — SIGNIFICANT CHANGE UP (ref 13–44)
LYMPHOCYTES # BLD AUTO: 4.32 K/UL — HIGH (ref 1–3.3)
LYMPHOCYTES # BLD AUTO: 4.32 K/UL — HIGH (ref 1–3.3)
MCHC RBC-ENTMCNC: 31.1 PG — SIGNIFICANT CHANGE UP (ref 27–34)
MCHC RBC-ENTMCNC: 31.1 PG — SIGNIFICANT CHANGE UP (ref 27–34)
MCHC RBC-ENTMCNC: 31.5 PG — SIGNIFICANT CHANGE UP (ref 27–34)
MCHC RBC-ENTMCNC: 31.5 PG — SIGNIFICANT CHANGE UP (ref 27–34)
MCHC RBC-ENTMCNC: 32.8 GM/DL — SIGNIFICANT CHANGE UP (ref 32–36)
MCHC RBC-ENTMCNC: 32.8 GM/DL — SIGNIFICANT CHANGE UP (ref 32–36)
MCHC RBC-ENTMCNC: 33.1 PG — SIGNIFICANT CHANGE UP (ref 27–34)
MCHC RBC-ENTMCNC: 33.1 PG — SIGNIFICANT CHANGE UP (ref 27–34)
MCHC RBC-ENTMCNC: 33.5 GM/DL — SIGNIFICANT CHANGE UP (ref 32–36)
MCHC RBC-ENTMCNC: 33.5 GM/DL — SIGNIFICANT CHANGE UP (ref 32–36)
MCHC RBC-ENTMCNC: 33.7 GM/DL — SIGNIFICANT CHANGE UP (ref 32–36)
MCHC RBC-ENTMCNC: 33.7 GM/DL — SIGNIFICANT CHANGE UP (ref 32–36)
MCV RBC AUTO: 94.2 FL — SIGNIFICANT CHANGE UP (ref 80–100)
MCV RBC AUTO: 94.2 FL — SIGNIFICANT CHANGE UP (ref 80–100)
MCV RBC AUTO: 94.8 FL — SIGNIFICANT CHANGE UP (ref 80–100)
MCV RBC AUTO: 94.8 FL — SIGNIFICANT CHANGE UP (ref 80–100)
MCV RBC AUTO: 98.1 FL — SIGNIFICANT CHANGE UP (ref 80–100)
MCV RBC AUTO: 98.1 FL — SIGNIFICANT CHANGE UP (ref 80–100)
MONOCYTES # BLD AUTO: 1.01 K/UL — HIGH (ref 0–0.9)
MONOCYTES # BLD AUTO: 1.01 K/UL — HIGH (ref 0–0.9)
MONOCYTES NFR BLD AUTO: 8 % — SIGNIFICANT CHANGE UP (ref 2–14)
MONOCYTES NFR BLD AUTO: 8 % — SIGNIFICANT CHANGE UP (ref 2–14)
NEUTROPHILS # BLD AUTO: 6.46 K/UL — SIGNIFICANT CHANGE UP (ref 1.8–7.4)
NEUTROPHILS # BLD AUTO: 6.46 K/UL — SIGNIFICANT CHANGE UP (ref 1.8–7.4)
NEUTROPHILS NFR BLD AUTO: 50.9 % — SIGNIFICANT CHANGE UP (ref 43–77)
NEUTROPHILS NFR BLD AUTO: 50.9 % — SIGNIFICANT CHANGE UP (ref 43–77)
PLATELET # BLD AUTO: 258 K/UL — SIGNIFICANT CHANGE UP (ref 150–400)
PLATELET # BLD AUTO: 258 K/UL — SIGNIFICANT CHANGE UP (ref 150–400)
PLATELET # BLD AUTO: 304 K/UL — SIGNIFICANT CHANGE UP (ref 150–400)
PLATELET # BLD AUTO: 304 K/UL — SIGNIFICANT CHANGE UP (ref 150–400)
PLATELET # BLD AUTO: 473 K/UL — HIGH (ref 150–400)
PLATELET # BLD AUTO: 473 K/UL — HIGH (ref 150–400)
POTASSIUM SERPL-MCNC: 4 MMOL/L — SIGNIFICANT CHANGE UP (ref 3.5–5.3)
POTASSIUM SERPL-MCNC: 4 MMOL/L — SIGNIFICANT CHANGE UP (ref 3.5–5.3)
POTASSIUM SERPL-SCNC: 4 MMOL/L — SIGNIFICANT CHANGE UP (ref 3.5–5.3)
POTASSIUM SERPL-SCNC: 4 MMOL/L — SIGNIFICANT CHANGE UP (ref 3.5–5.3)
PROT SERPL-MCNC: 6.7 GM/DL — SIGNIFICANT CHANGE UP (ref 6–8.3)
PROT SERPL-MCNC: 6.7 GM/DL — SIGNIFICANT CHANGE UP (ref 6–8.3)
PROTHROM AB SERPL-ACNC: 17.7 SEC — HIGH (ref 9.5–13)
PROTHROM AB SERPL-ACNC: 17.7 SEC — HIGH (ref 9.5–13)
RBC # BLD: 2.51 M/UL — LOW (ref 3.8–5.2)
RBC # BLD: 2.51 M/UL — LOW (ref 3.8–5.2)
RBC # BLD: 2.76 M/UL — LOW (ref 3.8–5.2)
RBC # BLD: 2.76 M/UL — LOW (ref 3.8–5.2)
RBC # BLD: 3.63 M/UL — LOW (ref 3.8–5.2)
RBC # BLD: 3.63 M/UL — LOW (ref 3.8–5.2)
RBC # FLD: 13.1 % — SIGNIFICANT CHANGE UP (ref 10.3–14.5)
RBC # FLD: 13.1 % — SIGNIFICANT CHANGE UP (ref 10.3–14.5)
RBC # FLD: 15.6 % — HIGH (ref 10.3–14.5)
RBC # FLD: 15.6 % — HIGH (ref 10.3–14.5)
RBC # FLD: 15.7 % — HIGH (ref 10.3–14.5)
RBC # FLD: 15.7 % — HIGH (ref 10.3–14.5)
SODIUM SERPL-SCNC: 142 MMOL/L — SIGNIFICANT CHANGE UP (ref 135–145)
SODIUM SERPL-SCNC: 142 MMOL/L — SIGNIFICANT CHANGE UP (ref 135–145)
WBC # BLD: 12.7 K/UL — HIGH (ref 3.8–10.5)
WBC # BLD: 12.7 K/UL — HIGH (ref 3.8–10.5)
WBC # BLD: 17.3 K/UL — HIGH (ref 3.8–10.5)
WBC # BLD: 17.3 K/UL — HIGH (ref 3.8–10.5)
WBC # BLD: 18 K/UL — HIGH (ref 3.8–10.5)
WBC # BLD: 18 K/UL — HIGH (ref 3.8–10.5)
WBC # FLD AUTO: 12.7 K/UL — HIGH (ref 3.8–10.5)
WBC # FLD AUTO: 12.7 K/UL — HIGH (ref 3.8–10.5)
WBC # FLD AUTO: 17.3 K/UL — HIGH (ref 3.8–10.5)
WBC # FLD AUTO: 17.3 K/UL — HIGH (ref 3.8–10.5)
WBC # FLD AUTO: 18 K/UL — HIGH (ref 3.8–10.5)
WBC # FLD AUTO: 18 K/UL — HIGH (ref 3.8–10.5)

## 2023-12-10 PROCEDURE — 83735 ASSAY OF MAGNESIUM: CPT

## 2023-12-10 PROCEDURE — 83880 ASSAY OF NATRIURETIC PEPTIDE: CPT

## 2023-12-10 PROCEDURE — 93010 ELECTROCARDIOGRAM REPORT: CPT

## 2023-12-10 PROCEDURE — P9016: CPT

## 2023-12-10 PROCEDURE — 85027 COMPLETE CBC AUTOMATED: CPT

## 2023-12-10 PROCEDURE — 84100 ASSAY OF PHOSPHORUS: CPT

## 2023-12-10 PROCEDURE — 86923 COMPATIBILITY TEST ELECTRIC: CPT

## 2023-12-10 PROCEDURE — 88300 SURGICAL PATH GROSS: CPT

## 2023-12-10 PROCEDURE — 71260 CT THORAX DX C+: CPT | Mod: 26,MA

## 2023-12-10 PROCEDURE — 36430 TRANSFUSION BLD/BLD COMPNT: CPT

## 2023-12-10 PROCEDURE — C1889: CPT

## 2023-12-10 PROCEDURE — 80048 BASIC METABOLIC PNL TOTAL CA: CPT

## 2023-12-10 PROCEDURE — 99291 CRITICAL CARE FIRST HOUR: CPT

## 2023-12-10 PROCEDURE — 85730 THROMBOPLASTIN TIME PARTIAL: CPT

## 2023-12-10 PROCEDURE — C9399: CPT

## 2023-12-10 PROCEDURE — 85610 PROTHROMBIN TIME: CPT

## 2023-12-10 PROCEDURE — 36415 COLL VENOUS BLD VENIPUNCTURE: CPT

## 2023-12-10 PROCEDURE — 88300 SURGICAL PATH GROSS: CPT | Mod: 26

## 2023-12-10 RX ORDER — CEFAZOLIN SODIUM 1 G
1000 VIAL (EA) INJECTION EVERY 8 HOURS
Refills: 0 | Status: DISCONTINUED | OUTPATIENT
Start: 2023-12-10 | End: 2023-12-13

## 2023-12-10 RX ORDER — METOPROLOL TARTRATE 50 MG
100 TABLET ORAL DAILY
Refills: 0 | Status: DISCONTINUED | OUTPATIENT
Start: 2023-12-10 | End: 2023-12-11

## 2023-12-10 RX ORDER — ONDANSETRON 8 MG/1
4 TABLET, FILM COATED ORAL ONCE
Refills: 0 | Status: DISCONTINUED | OUTPATIENT
Start: 2023-12-10 | End: 2023-12-10

## 2023-12-10 RX ORDER — LEVOTHYROXINE SODIUM 125 MCG
150 TABLET ORAL DAILY
Refills: 0 | Status: DISCONTINUED | OUTPATIENT
Start: 2023-12-10 | End: 2023-12-13

## 2023-12-10 RX ORDER — FENTANYL CITRATE 50 UG/ML
25 INJECTION INTRAVENOUS ONCE
Refills: 0 | Status: DISCONTINUED | OUTPATIENT
Start: 2023-12-10 | End: 2023-12-10

## 2023-12-10 RX ORDER — OXYCODONE HYDROCHLORIDE 5 MG/1
10 TABLET ORAL EVERY 6 HOURS
Refills: 0 | Status: DISCONTINUED | OUTPATIENT
Start: 2023-12-10 | End: 2023-12-13

## 2023-12-10 RX ORDER — ONDANSETRON 8 MG/1
4 TABLET, FILM COATED ORAL EVERY 6 HOURS
Refills: 0 | Status: DISCONTINUED | OUTPATIENT
Start: 2023-12-10 | End: 2023-12-13

## 2023-12-10 RX ORDER — ACETAMINOPHEN 500 MG
650 TABLET ORAL EVERY 6 HOURS
Refills: 0 | Status: DISCONTINUED | OUTPATIENT
Start: 2023-12-10 | End: 2023-12-13

## 2023-12-10 RX ORDER — OXYCODONE HYDROCHLORIDE 5 MG/1
5 TABLET ORAL ONCE
Refills: 0 | Status: DISCONTINUED | OUTPATIENT
Start: 2023-12-10 | End: 2023-12-10

## 2023-12-10 RX ORDER — PROTHROMBIN COMPLEX CONCENTRATE (HUMAN) 25.5; 16.5; 24; 22; 22; 26 [IU]/ML; [IU]/ML; [IU]/ML; [IU]/ML; [IU]/ML; [IU]/ML
11500 POWDER, FOR SOLUTION INTRAVENOUS ONCE
Refills: 0 | Status: DISCONTINUED | OUTPATIENT
Start: 2023-12-10 | End: 2023-12-10

## 2023-12-10 RX ORDER — PROTHROMBIN COMPLEX CONCENTRATE (HUMAN) 25.5; 16.5; 24; 22; 22; 26 [IU]/ML; [IU]/ML; [IU]/ML; [IU]/ML; [IU]/ML; [IU]/ML
5000 POWDER, FOR SOLUTION INTRAVENOUS ONCE
Refills: 0 | Status: COMPLETED | OUTPATIENT
Start: 2023-12-10 | End: 2023-12-10

## 2023-12-10 RX ORDER — PANTOPRAZOLE SODIUM 20 MG/1
40 TABLET, DELAYED RELEASE ORAL
Refills: 0 | Status: DISCONTINUED | OUTPATIENT
Start: 2023-12-10 | End: 2023-12-13

## 2023-12-10 RX ORDER — HYDROMORPHONE HYDROCHLORIDE 2 MG/ML
0.5 INJECTION INTRAMUSCULAR; INTRAVENOUS; SUBCUTANEOUS
Refills: 0 | Status: DISCONTINUED | OUTPATIENT
Start: 2023-12-10 | End: 2023-12-10

## 2023-12-10 RX ORDER — SODIUM CHLORIDE 9 MG/ML
1000 INJECTION, SOLUTION INTRAVENOUS
Refills: 0 | Status: DISCONTINUED | OUTPATIENT
Start: 2023-12-10 | End: 2023-12-10

## 2023-12-10 RX ORDER — CHOLECALCIFEROL (VITAMIN D3) 125 MCG
1000 CAPSULE ORAL DAILY
Refills: 0 | Status: DISCONTINUED | OUTPATIENT
Start: 2023-12-10 | End: 2023-12-13

## 2023-12-10 RX ORDER — CEFAZOLIN SODIUM 1 G
1000 VIAL (EA) INJECTION EVERY 8 HOURS
Refills: 0 | Status: DISCONTINUED | OUTPATIENT
Start: 2023-12-10 | End: 2023-12-10

## 2023-12-10 RX ORDER — MORPHINE SULFATE 50 MG/1
4 CAPSULE, EXTENDED RELEASE ORAL ONCE
Refills: 0 | Status: DISCONTINUED | OUTPATIENT
Start: 2023-12-10 | End: 2023-12-10

## 2023-12-10 RX ORDER — OXYCODONE HYDROCHLORIDE 5 MG/1
5 TABLET ORAL EVERY 6 HOURS
Refills: 0 | Status: DISCONTINUED | OUTPATIENT
Start: 2023-12-10 | End: 2023-12-13

## 2023-12-10 RX ORDER — CALCIUM CARBONATE 500(1250)
2 TABLET ORAL DAILY
Refills: 0 | Status: DISCONTINUED | OUTPATIENT
Start: 2023-12-10 | End: 2023-12-13

## 2023-12-10 RX ORDER — FENTANYL CITRATE 50 UG/ML
25 INJECTION INTRAVENOUS
Refills: 0 | Status: DISCONTINUED | OUTPATIENT
Start: 2023-12-10 | End: 2023-12-10

## 2023-12-10 RX ORDER — SODIUM CHLORIDE 9 MG/ML
1000 INJECTION INTRAMUSCULAR; INTRAVENOUS; SUBCUTANEOUS ONCE
Refills: 0 | Status: COMPLETED | OUTPATIENT
Start: 2023-12-10 | End: 2023-12-10

## 2023-12-10 RX ORDER — ATORVASTATIN CALCIUM 80 MG/1
20 TABLET, FILM COATED ORAL AT BEDTIME
Refills: 0 | Status: DISCONTINUED | OUTPATIENT
Start: 2023-12-10 | End: 2023-12-13

## 2023-12-10 RX ORDER — MORPHINE SULFATE 50 MG/1
2 CAPSULE, EXTENDED RELEASE ORAL EVERY 4 HOURS
Refills: 0 | Status: DISCONTINUED | OUTPATIENT
Start: 2023-12-10 | End: 2023-12-13

## 2023-12-10 RX ADMIN — MORPHINE SULFATE 2 MILLIGRAM(S): 50 CAPSULE, EXTENDED RELEASE ORAL at 18:28

## 2023-12-10 RX ADMIN — SODIUM CHLORIDE 75 MILLILITER(S): 9 INJECTION, SOLUTION INTRAVENOUS at 14:18

## 2023-12-10 RX ADMIN — MORPHINE SULFATE 2 MILLIGRAM(S): 50 CAPSULE, EXTENDED RELEASE ORAL at 16:46

## 2023-12-10 RX ADMIN — HYDROMORPHONE HYDROCHLORIDE 0.5 MILLIGRAM(S): 2 INJECTION INTRAMUSCULAR; INTRAVENOUS; SUBCUTANEOUS at 13:25

## 2023-12-10 RX ADMIN — MORPHINE SULFATE 2 MILLIGRAM(S): 50 CAPSULE, EXTENDED RELEASE ORAL at 22:35

## 2023-12-10 RX ADMIN — Medication 1000 MILLIGRAM(S): at 17:45

## 2023-12-10 RX ADMIN — FENTANYL CITRATE 25 MICROGRAM(S): 50 INJECTION INTRAVENOUS at 08:26

## 2023-12-10 RX ADMIN — HYDROMORPHONE HYDROCHLORIDE 0.5 MILLIGRAM(S): 2 INJECTION INTRAMUSCULAR; INTRAVENOUS; SUBCUTANEOUS at 14:25

## 2023-12-10 RX ADMIN — FENTANYL CITRATE 25 MICROGRAM(S): 50 INJECTION INTRAVENOUS at 07:35

## 2023-12-10 RX ADMIN — SODIUM CHLORIDE 1000 MILLILITER(S): 9 INJECTION INTRAMUSCULAR; INTRAVENOUS; SUBCUTANEOUS at 07:09

## 2023-12-10 RX ADMIN — MORPHINE SULFATE 4 MILLIGRAM(S): 50 CAPSULE, EXTENDED RELEASE ORAL at 05:43

## 2023-12-10 RX ADMIN — ATORVASTATIN CALCIUM 20 MILLIGRAM(S): 80 TABLET, FILM COATED ORAL at 22:24

## 2023-12-10 RX ADMIN — MORPHINE SULFATE 2 MILLIGRAM(S): 50 CAPSULE, EXTENDED RELEASE ORAL at 23:05

## 2023-12-10 RX ADMIN — PROTHROMBIN COMPLEX CONCENTRATE (HUMAN) 400 INTERNATIONAL UNIT(S): 25.5; 16.5; 24; 22; 22; 26 POWDER, FOR SOLUTION INTRAVENOUS at 07:45

## 2023-12-10 NOTE — PROGRESS NOTE ADULT - ASSESSMENT
47 yo female s/p R breast evacuation of hematoma, s/p bilateral mastectomy on 12/1 at [facility], doing well now post operatively.  -Pt is s/p 3 units PRBCs. Re-check CBC at 1800  -Pt is on xarelto for a-fib, was given kcentra in ED.  Continue to hold.  -Ancef 1g q12h for prophylaxis  -Monitor drain output    D/W Dr. Forbes 47 yo female s/p R breast evacuation of hematoma, s/p bilateral mastectomy on 12/1 at [facility], doing well now post operatively.  -Pt is s/p 3 units PRBCs. Re-check CBC at 1800  -Pt is on xarelto for a-fib, was given kcentra in ED.  Continue to hold.  -Ancef 1g q12h for prophylaxis  -Monitor drain output    D/W Dr. Forbes

## 2023-12-10 NOTE — ED PROVIDER NOTE - NSICDXPASTMEDICALHX_GEN_ALL_CORE_FT
PAST MEDICAL HISTORY:  Afib     Breast cancer     HLD (hyperlipidemia)     HTN (hypertension)     Non Hodgkin's lymphoma

## 2023-12-10 NOTE — ED ADULT NURSE NOTE - OBJECTIVE STATEMENT
Pt. is a 48YOF ambulatory to ED c/o right breast incisional bleeding. hx of B/L mastectomy three days ago, woke up this morning with bleeding and edema at right breast incision site. states "it feels like my breast is going to pop". hx of breast CA

## 2023-12-10 NOTE — PATIENT PROFILE ADULT - FUNCTIONAL ASSESSMENT - BASIC MOBILITY SCORE.
24 Intermediate Repair And Graft Additional Text (Will Appearing After The Standard Complex Repair Text): The intermediate repair was not sufficient to completely close the primary defect. The remaining additional defect was repaired with the graft mentioned below.

## 2023-12-10 NOTE — ED PROVIDER NOTE - CLINICAL SUMMARY MEDICAL DECISION MAKING FREE TEXT BOX
48-year-old female with painful and enlarging right breast on Xarelto.  Recent mastectomy 9 days ago with insertion of spacer.  Will obtain labs and urgent CT imaging to evaluate for active extravasation/active bleeding.  Spoke with radiology on-call to get urgent read who notes multiple areas of extravasation in the upper inner quadrant of the right breast with large hematoma.    Patient then became hemodynamically unstable with blood pressure of 50/30.  Code MTP activated and patient given blood products as well as fluids and Kcentra to reverse Xarelto with good response. Pressure dressing applied to chest. On-call plastic surgery paged for consult.  (of note it was documented Dr. Arredondo was contacted which was in error as she was not on call at that time). Spoke with on call Plastic Surgery Attending Dr. Forbes for consult who came in to evaluate patient and take to the OR for repair. Patient now hemodynamically stable. ICU consulted.

## 2023-12-10 NOTE — ED ADULT NURSE NOTE - NSFALLUNIVINTERV_ED_ALL_ED
Bed/Stretcher in lowest position, wheels locked, appropriate side rails in place/Call bell, personal items and telephone in reach/Instruct patient to call for assistance before getting out of bed/chair/stretcher/Non-slip footwear applied when patient is off stretcher/[place] to call system/Physically safe environment - no spills, clutter or unnecessary equipment/Purposeful proactive rounding/Room/bathroom lighting operational, light cord in reach Bed/Stretcher in lowest position, wheels locked, appropriate side rails in place/Call bell, personal items and telephone in reach/Instruct patient to call for assistance before getting out of bed/chair/stretcher/Non-slip footwear applied when patient is off stretcher/[place] to call system/Physically safe environment - no spills, clutter or unnecessary equipment/Purposeful proactive rounding/Room/bathroom lighting operational, light cord in reach

## 2023-12-10 NOTE — PATIENT PROFILE ADULT - FALL HARM RISK - HARM RISK INTERVENTIONS
Communicate Risk of Fall with Harm to all staff/Reinforce activity limits and safety measures with patient and family/Tailored Fall Risk Interventions/Visual Cue: Yellow wristband and red socks/Bed in lowest position, wheels locked, appropriate side rails in place/Call bell, personal items and telephone in reach/Instruct patient to call for assistance before getting out of bed or chair/Non-slip footwear when patient is out of bed/[place] to call system/Physically safe environment - no spills, clutter or unnecessary equipment/Purposeful Proactive Rounding/Room/bathroom lighting operational, light cord in reach Communicate Risk of Fall with Harm to all staff/Reinforce activity limits and safety measures with patient and family/Tailored Fall Risk Interventions/Visual Cue: Yellow wristband and red socks/Bed in lowest position, wheels locked, appropriate side rails in place/Call bell, personal items and telephone in reach/Instruct patient to call for assistance before getting out of bed or chair/Non-slip footwear when patient is out of bed/[place] to call system/Physically safe environment - no spills, clutter or unnecessary equipment/Purposeful Proactive Rounding/Room/bathroom lighting operational, light cord in reach

## 2023-12-10 NOTE — ED PROVIDER NOTE - OBJECTIVE STATEMENT
48F p/w Right breast pain and bleeding since waking up this morning just prior to arrival.  Patient  has a history of afib on Xarelto (last taken at 9pm last night), breast cancer of the right breast status post bilateral mastectomy with placement of spacers performed December 1 at [facility] with Denzel Pantoja and Tiana.  patient states the right breast is extremely swollen and enlarging.  Previously was similar size to her left breast since she went to bed last night.  denies any trauma to the area. 48F p/w Right breast pain and bleeding since waking up this morning just prior to arrival.  Patient  has a history of afib on Xarelto (last taken at 9pm last night), breast cancer of the right breast status post bilateral mastectomy with placement of spacers performed December 1 at [facility] with Denzel Pantoja and Tiana.  patient states the right breast is extremely swollen and enlarging.  Previously was similar size to her left breast since she went to bed last night.  denies any trauma to the area.

## 2023-12-10 NOTE — BRIEF OPERATIVE NOTE - OPERATION/FINDINGS
500cc hematoma evacuated from right breast   Implant removed   Active bleeding suture ligated   Hemostasis ensures

## 2023-12-10 NOTE — ED PROVIDER NOTE - PHYSICAL EXAMINATION
***GEN - + distress, un well appearing; A+O x3 ***HEAD - NC/AT ***EYES/NOSE - PERRL, EOMI, mucous membranes moist, no discharge ***THROAT: Oral cavity and pharynx normal. No inflammation, swelling, exudate, or lesions.  ***NECK: Neck supple, non-tender   ***PULMONARY - CTA b/l, symmetric breath sounds. ***CARDIAC -s1s2, RRR, no M,G,R  ***CHEST - tender and enlarged right breast with swelling and fullness extending to upper right chest/clavicle. 2 JPs at right lateral breast base and 1 on left side. ***ABDOMEN - +BS, ND, NT, soft  ***BACK - no CVA tenderness, Normal spine ***EXTREMITIES - symmetric pulses, 2+ dp, capillary refill < 2 seconds ***SKIN - no rash   ***NEUROLOGIC - alert, CN 2-12 intact

## 2023-12-10 NOTE — PATIENT PROFILE ADULT - CAREGIVER NAME
Recent travel to [facility]- after driving up to [facility]- the 1st day felt dizzy with muscle pain- day 2 body pain and sweating through her clothes and felt feverish, the rest of the trip continued to have head pressure and body aches- got home yesterday and symptoms remain today Malena Infante

## 2023-12-10 NOTE — ED ADULT TRIAGE NOTE - CHIEF COMPLAINT QUOTE
pt ambulatory to ED c/o right breast incisional bleeding. hx of B/L mastectomy three days ago, woke up this morning with bleeding and edema at right breast incision site. states "it feels like my breast is going to pop". hx of breast CA

## 2023-12-10 NOTE — PATIENT PROFILE ADULT - FUNCTIONAL ASSESSMENT - DAILY ACTIVITY SCORE.
Left voice message for patient to change appointment on 5/14/2020 from an office visit to a phone visit.
24

## 2023-12-10 NOTE — ED ADULT NURSE REASSESSMENT NOTE - NS ED NURSE REASSESS COMMENT FT1
Primary RN bedside, pt. became pale and diaphoretic, c/o increasing pain in right breast and feeling like she is going to pass out. Pt. moved to Trauma room, pt. became hypotensive. Code MTP called @0720. Dr Cates and Dr Staton bedside. 2nd IV started, fluids running as ordered.

## 2023-12-10 NOTE — PROGRESS NOTE ADULT - SUBJECTIVE AND OBJECTIVE BOX
Pt doing much better post-op.  Pain controlled.  Has not eaten yet.  Getting oob to the bathroom.    Vital Signs Last 24 Hrs  T(C): 36.3 (10 Dec 2023 11:37), Max: 36.7 (10 Dec 2023 06:13)  T(F): 97.4 (10 Dec 2023 11:37), Max: 98 (10 Dec 2023 06:13)  HR: 80 (10 Dec 2023 13:30) (67 - 123)  BP: 115/70 (10 Dec 2023 13:30) (54/33 - 151/86)  RR: 15 (10 Dec 2023 13:30) (12 - 20)  SpO2: 95% (10 Dec 2023 13:30) (95% - 100%)    Exam:  Gen-appears comfortable  CV-RRR  Chest- CTA bilat  R breast dressings c/d/i, No swelling  ALEX drains x 2 with minimal output      Labs:                        8.7    17.30 )-----------( 304      ( 10 Dec 2023 12:35 )             26.0

## 2023-12-10 NOTE — BRIEF OPERATIVE NOTE - NSICDXBRIEFPROCEDURE_GEN_ALL_CORE_FT
PROCEDURES:  Evacuation of hematoma of right breast 10-Dec-2023 11:19:45  Jose Maria De La Cruz  Removal of breast implant 10-Dec-2023 11:19:57  Jose Maria De La Cruz

## 2023-12-11 LAB
ADD ON TEST-SPECIMEN IN LAB: SIGNIFICANT CHANGE UP
ADD ON TEST-SPECIMEN IN LAB: SIGNIFICANT CHANGE UP
APTT BLD: 30.2 SEC — SIGNIFICANT CHANGE UP (ref 24.5–35.6)
APTT BLD: 30.2 SEC — SIGNIFICANT CHANGE UP (ref 24.5–35.6)
HCT VFR BLD CALC: 19.6 % — CRITICAL LOW (ref 34.5–45)
HCT VFR BLD CALC: 19.6 % — CRITICAL LOW (ref 34.5–45)
HCT VFR BLD CALC: 25 % — LOW (ref 34.5–45)
HCT VFR BLD CALC: 25 % — LOW (ref 34.5–45)
HGB BLD-MCNC: 6.5 G/DL — CRITICAL LOW (ref 11.5–15.5)
HGB BLD-MCNC: 6.5 G/DL — CRITICAL LOW (ref 11.5–15.5)
HGB BLD-MCNC: 8.6 G/DL — LOW (ref 11.5–15.5)
HGB BLD-MCNC: 8.6 G/DL — LOW (ref 11.5–15.5)
MCHC RBC-ENTMCNC: 30.7 PG — SIGNIFICANT CHANGE UP (ref 27–34)
MCHC RBC-ENTMCNC: 30.7 PG — SIGNIFICANT CHANGE UP (ref 27–34)
MCHC RBC-ENTMCNC: 31.3 PG — SIGNIFICANT CHANGE UP (ref 27–34)
MCHC RBC-ENTMCNC: 31.3 PG — SIGNIFICANT CHANGE UP (ref 27–34)
MCHC RBC-ENTMCNC: 33.2 GM/DL — SIGNIFICANT CHANGE UP (ref 32–36)
MCHC RBC-ENTMCNC: 33.2 GM/DL — SIGNIFICANT CHANGE UP (ref 32–36)
MCHC RBC-ENTMCNC: 34.4 GM/DL — SIGNIFICANT CHANGE UP (ref 32–36)
MCHC RBC-ENTMCNC: 34.4 GM/DL — SIGNIFICANT CHANGE UP (ref 32–36)
MCV RBC AUTO: 89.3 FL — SIGNIFICANT CHANGE UP (ref 80–100)
MCV RBC AUTO: 89.3 FL — SIGNIFICANT CHANGE UP (ref 80–100)
MCV RBC AUTO: 94.2 FL — SIGNIFICANT CHANGE UP (ref 80–100)
MCV RBC AUTO: 94.2 FL — SIGNIFICANT CHANGE UP (ref 80–100)
NT-PROBNP SERPL-SCNC: 188 PG/ML — HIGH (ref 0–125)
NT-PROBNP SERPL-SCNC: 188 PG/ML — HIGH (ref 0–125)
PLATELET # BLD AUTO: 240 K/UL — SIGNIFICANT CHANGE UP (ref 150–400)
PLATELET # BLD AUTO: 240 K/UL — SIGNIFICANT CHANGE UP (ref 150–400)
PLATELET # BLD AUTO: 268 K/UL — SIGNIFICANT CHANGE UP (ref 150–400)
PLATELET # BLD AUTO: 268 K/UL — SIGNIFICANT CHANGE UP (ref 150–400)
RBC # BLD: 2.08 M/UL — LOW (ref 3.8–5.2)
RBC # BLD: 2.08 M/UL — LOW (ref 3.8–5.2)
RBC # BLD: 2.8 M/UL — LOW (ref 3.8–5.2)
RBC # BLD: 2.8 M/UL — LOW (ref 3.8–5.2)
RBC # FLD: 15.6 % — HIGH (ref 10.3–14.5)
RBC # FLD: 15.6 % — HIGH (ref 10.3–14.5)
RBC # FLD: 16.7 % — HIGH (ref 10.3–14.5)
RBC # FLD: 16.7 % — HIGH (ref 10.3–14.5)
SURGICAL PATHOLOGY STUDY: SIGNIFICANT CHANGE UP
SURGICAL PATHOLOGY STUDY: SIGNIFICANT CHANGE UP
WBC # BLD: 14.23 K/UL — HIGH (ref 3.8–10.5)
WBC # BLD: 14.23 K/UL — HIGH (ref 3.8–10.5)
WBC # BLD: 15.46 K/UL — HIGH (ref 3.8–10.5)
WBC # BLD: 15.46 K/UL — HIGH (ref 3.8–10.5)
WBC # FLD AUTO: 14.23 K/UL — HIGH (ref 3.8–10.5)
WBC # FLD AUTO: 14.23 K/UL — HIGH (ref 3.8–10.5)
WBC # FLD AUTO: 15.46 K/UL — HIGH (ref 3.8–10.5)
WBC # FLD AUTO: 15.46 K/UL — HIGH (ref 3.8–10.5)

## 2023-12-11 PROCEDURE — 99255 IP/OBS CONSLTJ NEW/EST HI 80: CPT

## 2023-12-11 RX ORDER — ROSUVASTATIN CALCIUM 5 MG/1
1 TABLET ORAL
Refills: 0 | DISCHARGE

## 2023-12-11 RX ORDER — LANOLIN ALCOHOL/MO/W.PET/CERES
5 CREAM (GRAM) TOPICAL AT BEDTIME
Refills: 0 | Status: DISCONTINUED | OUTPATIENT
Start: 2023-12-11 | End: 2023-12-13

## 2023-12-11 RX ORDER — TIRZEPATIDE 15 MG/.5ML
2.5 INJECTION, SOLUTION SUBCUTANEOUS
Refills: 0 | DISCHARGE

## 2023-12-11 RX ORDER — LEVOTHYROXINE SODIUM 125 MCG
1 TABLET ORAL
Refills: 0 | DISCHARGE

## 2023-12-11 RX ORDER — METOPROLOL TARTRATE 50 MG
50 TABLET ORAL DAILY
Refills: 0 | Status: DISCONTINUED | OUTPATIENT
Start: 2023-12-11 | End: 2023-12-13

## 2023-12-11 RX ORDER — CALCIUM CARBONATE 500(1250)
2 TABLET ORAL
Refills: 0 | DISCHARGE

## 2023-12-11 RX ORDER — MAGNESIUM OXIDE 400 MG ORAL TABLET 241.3 MG
1 TABLET ORAL
Refills: 0 | DISCHARGE

## 2023-12-11 RX ORDER — AMLODIPINE BESYLATE 2.5 MG/1
1 TABLET ORAL
Refills: 0 | DISCHARGE

## 2023-12-11 RX ORDER — METOPROLOL TARTRATE 50 MG
1 TABLET ORAL
Refills: 0 | DISCHARGE

## 2023-12-11 RX ORDER — CHOLECALCIFEROL (VITAMIN D3) 125 MCG
1 CAPSULE ORAL
Refills: 0 | DISCHARGE

## 2023-12-11 RX ORDER — SODIUM CHLORIDE 9 MG/ML
1000 INJECTION INTRAMUSCULAR; INTRAVENOUS; SUBCUTANEOUS
Refills: 0 | Status: DISCONTINUED | OUTPATIENT
Start: 2023-12-11 | End: 2023-12-13

## 2023-12-11 RX ORDER — FUROSEMIDE 40 MG
40 TABLET ORAL ONCE
Refills: 0 | Status: COMPLETED | OUTPATIENT
Start: 2023-12-11 | End: 2023-12-11

## 2023-12-11 RX ADMIN — Medication 1000 MILLIGRAM(S): at 17:09

## 2023-12-11 RX ADMIN — ATORVASTATIN CALCIUM 20 MILLIGRAM(S): 80 TABLET, FILM COATED ORAL at 21:54

## 2023-12-11 RX ADMIN — Medication 40 MILLIGRAM(S): at 13:48

## 2023-12-11 RX ADMIN — MORPHINE SULFATE 2 MILLIGRAM(S): 50 CAPSULE, EXTENDED RELEASE ORAL at 12:00

## 2023-12-11 RX ADMIN — SODIUM CHLORIDE 75 MILLILITER(S): 9 INJECTION INTRAMUSCULAR; INTRAVENOUS; SUBCUTANEOUS at 18:16

## 2023-12-11 RX ADMIN — PANTOPRAZOLE SODIUM 40 MILLIGRAM(S): 20 TABLET, DELAYED RELEASE ORAL at 06:04

## 2023-12-11 RX ADMIN — Medication 150 MICROGRAM(S): at 06:04

## 2023-12-11 RX ADMIN — MORPHINE SULFATE 2 MILLIGRAM(S): 50 CAPSULE, EXTENDED RELEASE ORAL at 08:14

## 2023-12-11 RX ADMIN — MORPHINE SULFATE 2 MILLIGRAM(S): 50 CAPSULE, EXTENDED RELEASE ORAL at 17:19

## 2023-12-11 RX ADMIN — MORPHINE SULFATE 2 MILLIGRAM(S): 50 CAPSULE, EXTENDED RELEASE ORAL at 21:55

## 2023-12-11 RX ADMIN — MORPHINE SULFATE 2 MILLIGRAM(S): 50 CAPSULE, EXTENDED RELEASE ORAL at 06:04

## 2023-12-11 RX ADMIN — MORPHINE SULFATE 2 MILLIGRAM(S): 50 CAPSULE, EXTENDED RELEASE ORAL at 16:17

## 2023-12-11 RX ADMIN — Medication 1000 MILLIGRAM(S): at 10:41

## 2023-12-11 RX ADMIN — Medication 1000 UNIT(S): at 11:04

## 2023-12-11 RX ADMIN — Medication 1000 MILLIGRAM(S): at 00:42

## 2023-12-11 RX ADMIN — MORPHINE SULFATE 2 MILLIGRAM(S): 50 CAPSULE, EXTENDED RELEASE ORAL at 11:05

## 2023-12-11 NOTE — PROGRESS NOTE ADULT - SUBJECTIVE AND OBJECTIVE BOX
Seen and examined at bedside. Resting comfortably. Mild soreness right chest improved from last evening.   AVSS  Temperature  Temp (F): 99.1 Degrees F  Temp (C) Temp (C): 37.3 Degrees C  Temp site Temp Site: oral    Heart Rate  Heart Rate Heart Rate (beats/min): 87 /min  Heart Rate Method Method: noninvasive blood pressure monitor    Noninvasive Blood Pressure  BP Systolic Systolic: 112 mm Hg  BP Diastolic Diastolic (mm Hg): 53 mm Hg  Blood Pressure - Site Site: left upper arm  Blood Pressure - Method Method: electronic    Respiratory/Pulse Oximetry/Oxygen Therapy  Respiration Rate (breaths/min) Respiration Rate (breaths/min): 18 /min  SpO2 (%) SpO2 (%): 100 %  O2 Delivery/Oxygen Delivery Method Patient On (Oxygen Delivery Method): room air    Chest:  ALEX drains: 1 30cc 2 55cc serosang > sang  Pressure dressing in place: CDI  Ext: SCD's

## 2023-12-11 NOTE — PROGRESS NOTE ADULT - ASSESSMENT
#Anemia of Blood Loss 2ndry to Right Breast Hematoma   #S/P R breast evacuation of hematoma, s/p bilateral mastectomy on 12/1 at [facility] [place]    -SP Kcentra in ED  -Pt is s/p 3 units PRBCs prior to OR   -Additional 2 untis of PRBC today with IV lasix X 1  -Hold  xarelto   -Ancef 1g q12h for prophylaxis  -Monitor drain output  - Monitor CBC and keep NPO for possible OR if patients H/H continues to drop   -Hospitalist consult appreciated for medical management  -plan discussed and formulated with Dr Forbes/plastics    #VTE ppx  -SCDS #Anemia of Blood Loss 2ndry to Right Breast Hematoma   #S/P R breast evacuation of hematoma, [place] [facility] [place]    -SP Kcentra in ED  -Pt is s/p 3 units PRBCs prior to OR   -Additional 2 untis of PRBC today with IV lasix X 1  -Hold  xarelto   -Ancef 1g q12h for prophylaxis  -Monitor drain output  - Monitor CBC and keep NPO for possible OR if patients H/H continues to drop   -Hospitalist consult appreciated for medical management  -plan discussed and formulated with Dr Forbes/plastics    #VTE ppx  -SCDS

## 2023-12-11 NOTE — CONSULT NOTE ADULT - SUBJECTIVE AND OBJECTIVE BOX
CHIEF COMPLAINT: Patient is a 48y old  Female who presents with a chief complaint of     HPI:· 48F p/w Right breast pain and bleeding .  Patient  has a history of afib on Xarelto (last taken at 9pm night prior to admission), breast cancer of the right breast status post bilateral mastectomy with placement of spacers performed  at [facility] [place] with Denzel Pantoja and Tiana.  patient states the right breast is extremely swollen and enlarging.  Previously was similar size to her left breast since she went to bed last night.  denies any trauma to the area.      23 s/p surgery yesterday to suture bleeding artery and remove  spacer      PMHx: PAST MEDICAL & SURGICAL HISTORY:  HTN (hypertension)      HLD (hyperlipidemia)      Afib      Non Hodgkin's lymphoma      Breast cancer      History of ankle surgery      H/O tubal ligation      H/O  section            Soc Hx: no smoking,  wine  , children      Allergies: Allergies    Augmentin (Rash)    Intolerances          Vital Signs Last 24 Hrs  T(C): 36.8 (10 Dec 2023 20:37), Max: 36.8 (10 Dec 2023 20:37)  T(F): 98.2 (10 Dec 2023 20:37), Max: 98.2 (10 Dec 2023 20:37)  HR: 97 (10 Dec 2023 20:37) (67 - 123)  BP: 103/54 (10 Dec 2023 20:37) (54/33 - 151/86)  BP(mean): --  RR: 18 (10 Dec 2023 20:37) (12 - 20)  SpO2: 98% (10 Dec 2023 20:37) (95% - 100%)    Parameters below as of 10 Dec 2023 20:37  Patient On (Oxygen Delivery Method): room air        I&O's Summary    10 Dec 2023 07:01  -  11 Dec 2023 07:00  --------------------------------------------------------  IN: 2500 mL / OUT: 915 mL / NET: 1585 mL            PHYSICAL EXAM:   Constitutional: NAD, awake and alert, well-developed  HEENT: PERR, EOMI, Normal Hearing, MMM  Neck: Soft and supple, No LAD, No JVD  Respiratory: Breath sounds are clear bilaterally, No wheezing, rales or rhonchi  Cardiovascular: S1 and S2, regular rate and rhythm, no Murmurs, gallops or rubs  Gastrointestinal: Bowel Sounds present, soft, nontender, nondistended, no guarding, no rebound  Extremities: No peripheral edema  Vascular: 2+ peripheral pulses  Neurological: A/O x 3, no focal deficits  Musculoskeletal: 5/5 strength b/l upper and lower extremities  Skin: No rashes    MEDICATIONS:  MEDICATIONS  (STANDING):  atorvastatin 20 milliGRAM(s) Oral at bedtime  calcium carbonate    500 mG (Tums) Chewable 2 Tablet(s) Chew daily  ceFAZolin  Injectable. 1000 milliGRAM(s) IV Push every 8 hours  cholecalciferol 1000 Unit(s) Oral daily  levothyroxine 150 MICROGram(s) Oral daily  metoprolol succinate  milliGRAM(s) Oral daily  pantoprazole    Tablet 40 milliGRAM(s) Oral before breakfast      LABS: All Labs Reviewed:                        7.8    18.00 )-----------( 258      ( 10 Dec 2023 18:31 )             23.8     12-10    142  |  110<H>  |  8   ----------------------------<  165<H>  4.0   |  25  |  0.63    Ca    8.6      10 Dec 2023 06:33    TPro  6.7  /  Alb  3.0<L>  /  TBili  0.2  /  DBili  x   /  AST  18  /  ALT  44  /  AlkPhos  111  12-10    PT/INR - ( 10 Dec 2023 06:33 )   PT: 17.7 sec;   INR: 1.59 ratio         PTT - ( 10 Dec 2023 06:33 )  PTT:39.9 sec        RADIOLOGY:< from: CT Chest w/ IV Cont (12.10.23 @ 07:05) >    IMPRESSION:  Active arterial extravasation within the upper-inner quadrant of the   right breast with large hematoma surrounding the ruptured right breast   implant.    Findings were discussed with Dr. KRISTIAN MORA [phone] 12/10/2023   7:08 AM by Dr. Phillips withread back confirmation.    --- End of Report ---            ROBERTO PHILLIPS MD; Attending Radiologist  This document has been electronically signed. Dec 10 2023  7:17AM    < end of copied text >      EKG:< from: 12 Lead ECG (12.10.23 @ 07:26) >    Diagnosis Line Normal sinus rhythm  Normal ECG  No previous ECGs available  Confirmed by Palla MD, Venugopal ([id number]) on 12/10/2023 3:03:29 PM    < end of copied text >      Telemetry: SR           CHIEF COMPLAINT: Patient is a 48y old  Female who presents with a chief complaint of     HPI:· 48F p/w Right breast pain and bleeding .  Patient  has a history of afib on Xarelto (last taken at 9pm night prior to admission), breast cancer of the right breast status post bilateral mastectomy with placement of spacers performed  at [facility] [place] with Denzel Pantoja and Tiana.  patient states the right breast is extremely swollen and enlarging.  Previously was similar size to her left breast since she went to bed last night.  denies any trauma to the area.      23 s/p surgery yesterday to suture bleeding artery and remove  spacer      PMHx: PAST MEDICAL & SURGICAL HISTORY:  HTN (hypertension)      HLD (hyperlipidemia)      Afib      Non Hodgkin's lymphoma      Breast cancer      History of ankle surgery      H/O tubal ligation      H/O  section            Soc Hx: no smoking,  wine  , children      Allergies: Allergies    Augmentin (Rash)    Intolerances          Vital Signs Last 24 Hrs  T(C): 36.8 (10 Dec 2023 20:37), Max: 36.8 (10 Dec 2023 20:37)  T(F): 98.2 (10 Dec 2023 20:37), Max: 98.2 (10 Dec 2023 20:37)  HR: 97 (10 Dec 2023 20:37) (67 - 123)  BP: 103/54 (10 Dec 2023 20:37) (54/33 - 151/86)  BP(mean): --  RR: 18 (10 Dec 2023 20:37) (12 - 20)  SpO2: 98% (10 Dec 2023 20:37) (95% - 100%)    Parameters below as of 10 Dec 2023 20:37  Patient On (Oxygen Delivery Method): room air        I&O's Summary    10 Dec 2023 07:01  -  11 Dec 2023 07:00  --------------------------------------------------------  IN: 2500 mL / OUT: 915 mL / NET: 1585 mL            PHYSICAL EXAM:   Constitutional: NAD, awake and alert, well-developed  HEENT: PERR, EOMI, Normal Hearing, MMM  Neck: Soft and supple, No LAD, No JVD  Respiratory: Breath sounds are clear bilaterally, No wheezing, rales or rhonchi  Cardiovascular: S1 and S2, regular rate and rhythm, no Murmurs, gallops or rubs  Gastrointestinal: Bowel Sounds present, soft, nontender, nondistended, no guarding, no rebound  Extremities: No peripheral edema  Vascular: 2+ peripheral pulses  Neurological: A/O x 3, no focal deficits  Musculoskeletal: 5/5 strength b/l upper and lower extremities  Skin: No rashes    MEDICATIONS:  MEDICATIONS  (STANDING):  atorvastatin 20 milliGRAM(s) Oral at bedtime  calcium carbonate    500 mG (Tums) Chewable 2 Tablet(s) Chew daily  ceFAZolin  Injectable. 1000 milliGRAM(s) IV Push every 8 hours  cholecalciferol 1000 Unit(s) Oral daily  levothyroxine 150 MICROGram(s) Oral daily  metoprolol succinate  milliGRAM(s) Oral daily  pantoprazole    Tablet 40 milliGRAM(s) Oral before breakfast      LABS: All Labs Reviewed:                        7.8    18.00 )-----------( 258      ( 10 Dec 2023 18:31 )             23.8     12-10    142  |  110<H>  |  8   ----------------------------<  165<H>  4.0   |  25  |  0.63    Ca    8.6      10 Dec 2023 06:33    TPro  6.7  /  Alb  3.0<L>  /  TBili  0.2  /  DBili  x   /  AST  18  /  ALT  44  /  AlkPhos  111  12-10    PT/INR - ( 10 Dec 2023 06:33 )   PT: 17.7 sec;   INR: 1.59 ratio         PTT - ( 10 Dec 2023 06:33 )  PTT:39.9 sec        RADIOLOGY:< from: CT Chest w/ IV Cont (12.10.23 @ 07:05) >    IMPRESSION:  Active arterial extravasation within the upper-inner quadrant of the   right breast with large hematoma surrounding the ruptured right breast   implant.    Findings were discussed with Dr. KRISTIAN MORA [phone] 12/10/2023   7:08 AM by Dr. Phillips withread back confirmation.    --- End of Report ---            ROBERTO PHILLIPS MD; Attending Radiologist  This document has been electronically signed. Dec 10 2023  7:17AM    < end of copied text >      EKG:< from: 12 Lead ECG (12.10.23 @ 07:26) >    Diagnosis Line Normal sinus rhythm  Normal ECG  No previous ECGs available  Confirmed by Palla MD, Venugopal ([id number]) on 12/10/2023 3:03:29 PM    < end of copied text >      Telemetry: SR

## 2023-12-11 NOTE — PROGRESS NOTE ADULT - ASSESSMENT
POD1 s/p evacuation of hematoma/suture ligation of bleeding vessel, washout, drain placement x2.   Drains to bulb suction. Total output today  70 cc serosang. AVSS.   Very comfortable.    Post transfusion CBC then 6 hours post  Reg diet/NPO past midnight with maintenance fluids

## 2023-12-11 NOTE — PHARMACOTHERAPY INTERVENTION NOTE - COMMENTS
Medication history complete, reviewed medication with patient and confirmed with DrFirst.  Patient was taking Wegovy but stopped approximately 1 month ago prior to recent surgery.

## 2023-12-11 NOTE — CONSULT NOTE ADULT - SUBJECTIVE AND OBJECTIVE BOX
HOSPITALIST PROGRESS NOTE:  SUBJECTIVE:  PCP:  Chief Complaint: Patient is a 48y old  Female who presents with a chief complaint of active bleeding/ hematoma right breast (11 Dec 2023 09:22)      HPI:  48F with PMH of AFIB on Xarelto, Hodgkins Lymphoma S/P Radiation, HTN, HLD, Hypothyroidism, Right Breast Cancer S/P recent B/L Mastectomy on 12/1 presents to the ER c/o Right breast pain and bleeding since waking up this morning just prior to arrival.  Patient  has a history of afib on Xarelto (last taken at 9pm last night), breast cancer of the right breast status post bilateral mastectomy with placement of spacers performed December 1 at [facility] [place] with Denzel Pantoja and Tiana.  patient states the right breast is extremely swollen and enlarging.  Previously was similar size to her left breast since she went to bed last night.  denies any trauma to the area.    Patient receieved Kcentra in the ED. She was given 3 units of PRBC and was taken to the OR yesterday with plastics for  exploration, evacuation of hematoma, suture ligation of active bleeding vessel, placement of hemoblast, drains x2. this morning patient had a hgb in 6 range; Hospitalist was called for medical management.      Patient denies any CP, dyspnea, N/V/D, Palpitations, dizziness, dyspnea, Hematemesis, Hematochezia or vaginal Bleeding.      Past Medical History: AFIB on Xarelto, Hodgkins Lymphoma S/P Radiation, HTN, HLD, Hypothyroidism, Right Breast Cancer S/P recent B/L Mastectomy  Past Surgical History: Right Breast Cancer S/P recent B/L Mastectomy and CSection  Social History: Lives at home. No tobacco, alcohol or illicit drug use   Family History: Mother alive at 75 with AFIB and Father alive at 79 no medical issues       Allergies:  Augmentin (Rash)    REVIEW OF SYSTEMS:  See HPI. All other review of systems is negative unless indicated above.     OBJECTIVE  Physical Exam:  Vital Signs:    Vital Signs Last 24 Hrs  T(C): 37.1 (11 Dec 2023 10:30), Max: 37.3 (11 Dec 2023 09:02)  T(F): 98.7 (11 Dec 2023 10:30), Max: 99.1 (11 Dec 2023 09:02)  HR: 91 (11 Dec 2023 10:30) (73 - 116)  BP: 123/65 (11 Dec 2023 10:30) (95/50 - 138/99)  BP(mean): --  RR: 18 (11 Dec 2023 10:30) (12 - 18)  SpO2: 100% (11 Dec 2023 10:30) (95% - 100%)    Parameters below as of 11 Dec 2023 10:30  Patient On (Oxygen Delivery Method): room air      I&O's Summary    10 Dec 2023 07:01  -  11 Dec 2023 07:00  --------------------------------------------------------  IN: 2500 mL / OUT: 1015 mL / NET: 1485 mL    Constitutional: NAD, awake and alert  Neurological: AAO x 3, no focal deficits  HEENT: PERRLA, EOMI, MMM  Neck: Soft and supple, No LAD, No JVD  Respiratory: Breath sounds are clear bilaterally, No wheezing, rales or rhonchi  Cardiovascular: S1 and S2, regular rate and rhythm; no Murmurs, gallops or rubs  Gastrointestinal: Bowel Sounds present, soft, nontender, nondistended, no guarding, no rebound tenderness  Back: No CVA tenderness   Extremities: No peripheral edema  Vascular: 2+ peripheral pulses  Musculoskeletal: 5/5 strength b/l upper and lower extremities  Skin: No rashes  Breast: Dressing intact; +2 drains from right breast and one drain from left breast   Rectal: Deferred    MEDICATIONS  (STANDING):  atorvastatin 20 milliGRAM(s) Oral at bedtime  calcium carbonate    500 mG (Tums) Chewable 2 Tablet(s) Chew daily  ceFAZolin  Injectable. 1000 milliGRAM(s) IV Push every 8 hours  cholecalciferol 1000 Unit(s) Oral daily  furosemide   Injectable 40 milliGRAM(s) IV Push once  levothyroxine 150 MICROGram(s) Oral daily  pantoprazole    Tablet 40 milliGRAM(s) Oral before breakfast  sodium chloride 0.9%. 1000 milliLiter(s) (75 mL/Hr) IV Continuous <Continuous>      LABS: All Labs Reviewed:                        6.5    15.46 )-----------( 268      ( 11 Dec 2023 06:34 )             19.6     12-10    142  |  110<H>  |  8   ----------------------------<  165<H>  4.0   |  25  |  0.63    Ca    8.6      10 Dec 2023 06:33    TPro  6.7  /  Alb  3.0<L>  /  TBili  0.2  /  DBili  x   /  AST  18  /  ALT  44  /  AlkPhos  111  12-10    PT/INR - ( 10 Dec 2023 06:33 )   PT: 17.7 sec;   INR: 1.59 ratio         PTT - ( 11 Dec 2023 09:44 )  PTT:30.2 sec        Urinalysis Basic - ( 10 Dec 2023 06:33 )    Color: x / Appearance: x / SG: x / pH: x  Gluc: 165 mg/dL / Ketone: x  / Bili: x / Urobili: x   Blood: x / Protein: x / Nitrite: x   Leuk Esterase: x / RBC: x / WBC x   Sq Epi: x / Non Sq Epi: x / Bacteria: x    RADIOLOGY/EKG:    EKG NSR    Tele Sinus tachy 110    < from: CT Chest w/ IV Cont (12.10.23 @ 07:05) >    IMPRESSION:  Active arterial extravasation within the upper-inner quadrant of the   right breast with large hematoma surrounding the ruptured right breast   implant.    < end of copied text >           HOSPITALIST PROGRESS NOTE:  SUBJECTIVE:  PCP:  Chief Complaint: Patient is a 48y old  Female who presents with a chief complaint of active bleeding/ hematoma right breast (11 Dec 2023 09:22)      HPI:  48F with PMH of AFIB on Xarelto, Hodgkins Lymphoma S/P Radiation, HTN, HLD, Hypothyroidism, Right Breast Cancer S/P recent B/L Mastectomy on 12/1 presents to the ER c/o Right breast pain and bleeding since waking up this morning just prior to arrival.  Patient  has a history of afib on Xarelto (last taken at 9pm last night), breast cancer of the right breast status post bilateral mastectomy with placement of spacers performed December 1 at [facility] [place] with Denzel Pantoja and Tiana.  patient states the right breast is extremely swollen and enlarging.  Previously was similar size to her left breast since she went to bed last night.  denies any trauma to the area.    Patient receieved Kcentra in the ED. She was given 3 units of PRBC and was taken to the OR yesterday with plastics for  exploration, evacuation of hematoma, suture ligation of active bleeding vessel, placement of hemoblast, drains x2. this morning patient had a hgb in 6 range; Hospitalist was called for medical management.      Patient denies any CP, dyspnea, N/V/D, Palpitations, dizziness, dyspnea, Hematemesis, Hematochezia or vaginal Bleeding.      Past Medical History: AFIB on Xarelto, Hodgkins Lymphoma S/P Radiation, HTN, HLD, Hypothyroidism, Right Breast Cancer S/P recent B/L Mastectomy  Past Surgical History: Right Breast Cancer S/P recent B/L Mastectomy and CSection  Social History: Lives at home. No tobacco, alcohol or illicit drug use   Family History: Mother alive at 75 with AFIB and Father alive at 79 no medical issues       Allergies:  Augmentin (Rash)    REVIEW OF SYSTEMS:  See HPI. All other review of systems is negative unless indicated above.     OBJECTIVE  Physical Exam:  Vital Signs:    Vital Signs Last 24 Hrs  T(C): 37.1 (11 Dec 2023 10:30), Max: 37.3 (11 Dec 2023 09:02)  T(F): 98.7 (11 Dec 2023 10:30), Max: 99.1 (11 Dec 2023 09:02)  HR: 91 (11 Dec 2023 10:30) (73 - 116)  BP: 123/65 (11 Dec 2023 10:30) (95/50 - 138/99)  BP(mean): --  RR: 18 (11 Dec 2023 10:30) (12 - 18)  SpO2: 100% (11 Dec 2023 10:30) (95% - 100%)    Parameters below as of 11 Dec 2023 10:30  Patient On (Oxygen Delivery Method): room air      I&O's Summary    10 Dec 2023 07:01  -  11 Dec 2023 07:00  --------------------------------------------------------  IN: 2500 mL / OUT: 1015 mL / NET: 1485 mL    Constitutional: NAD, awake and alert  Neurological: AAO x 3, no focal deficits  HEENT: PERRLA, EOMI, MMM  Neck: Soft and supple, No LAD, No JVD  Respiratory: Breath sounds are clear bilaterally, No wheezing, rales or rhonchi  Cardiovascular: S1 and S2, regular rate and rhythm; no Murmurs, gallops or rubs  Gastrointestinal: Bowel Sounds present, soft, nontender, nondistended, no guarding, no rebound tenderness  Back: No CVA tenderness   Extremities: No peripheral edema  Vascular: 2+ peripheral pulses  Musculoskeletal: 5/5 strength b/l upper and lower extremities  Skin: No rashes  Breast: Dressing intact; +2 drains from right breast and one drain from left breast   Rectal: Deferred    MEDICATIONS  (STANDING):  atorvastatin 20 milliGRAM(s) Oral at bedtime  calcium carbonate    500 mG (Tums) Chewable 2 Tablet(s) Chew daily  ceFAZolin  Injectable. 1000 milliGRAM(s) IV Push every 8 hours  cholecalciferol 1000 Unit(s) Oral daily  furosemide   Injectable 40 milliGRAM(s) IV Push once  levothyroxine 150 MICROGram(s) Oral daily  pantoprazole    Tablet 40 milliGRAM(s) Oral before breakfast  sodium chloride 0.9%. 1000 milliLiter(s) (75 mL/Hr) IV Continuous <Continuous>      LABS: All Labs Reviewed:                        6.5    15.46 )-----------( 268      ( 11 Dec 2023 06:34 )             19.6     12-10    142  |  110<H>  |  8   ----------------------------<  165<H>  4.0   |  25  |  0.63    Ca    8.6      10 Dec 2023 06:33    TPro  6.7  /  Alb  3.0<L>  /  TBili  0.2  /  DBili  x   /  AST  18  /  ALT  44  /  AlkPhos  111  12-10    PT/INR - ( 10 Dec 2023 06:33 )   PT: 17.7 sec;   INR: 1.59 ratio         PTT - ( 11 Dec 2023 09:44 )  PTT:30.2 sec        Urinalysis Basic - ( 10 Dec 2023 06:33 )    Color: x / Appearance: x / SG: x / pH: x  Gluc: 165 mg/dL / Ketone: x  / Bili: x / Urobili: x   Blood: x / Protein: x / Nitrite: x   Leuk Esterase: x / RBC: x / WBC x   Sq Epi: x / Non Sq Epi: x / Bacteria: x    RADIOLOGY/EKG:    EKG NSR    Tele Sinus tachy 110    < from: CT Chest w/ IV Cont (12.10.23 @ 07:05) >    IMPRESSION:  Active arterial extravasation within the upper-inner quadrant of the   right breast with large hematoma surrounding the ruptured right breast   implant.    < end of copied text >

## 2023-12-11 NOTE — CONSULT NOTE ADULT - ASSESSMENT
#Anemia of Blood Loss 2ndry to Right Breast Hematoma   #S/P R breast evacuation of hematoma, s/p bilateral mastectomy on 12/1 at [facility] [place]  -Management as per Plastic Surgery  -SP Kcentra in ED  -Pt is s/p 3 units PRBCs. Give Additional 2 untis of PRBC today with IV lasix X 1  -Hold  xarelto   -Ancef 1g q12h for prophylaxis  -Monitor drain output  -Discussed with surgery PA - Monitor CBC and NPO for possible OR if patients H/H continues to drop     #Leukocytosis most likely reactive  -continue to monitor WBC     #AFIB/HTN/HLD/Hypothyroidism  -continue tele  - Sinus tachy   -Hold Xarelto and Norvasc   -continue Metoprolol and Synthroid   -cardio consult appreciated    #Hx of Right breast Cancer/Hodgkins Lymphoma S/P Radiation  -FU with [facility]    #VTE ppx  -SCDS    Thank you for this courtesy consult. Will continue to follow        #Anemia of Blood Loss 2ndry to Right Breast Hematoma   #S/P R breast evacuation of hematoma, s/p bilateral mastectomy on 12/1 at [facility] [place]  -Management as per Plastic Surgery  -SP Kcentra in ED  -Pt is s/p 3 units PRBCs. Give Additional 2 untis of PRBC today with IV lasix X 1  -Hold  xarelto   -Ancef 1g q12h for prophylaxis  -Monitor drain output  -Discussed with surgery PA - Monitor CBC and NPO for possible OR if patients H/H continues to drop     #Leukocytosis most likely reactive  -continue to monitor WBC     #AFIB/HTN/HLD/Hypothyroidism  -continue tele  - Sinus tachy   -Hold Xarelto and Norvasc   -continue Metoprolol and Synthroid   -cardio consult appreciated    #Hx of Right breast Cancer/Hodgkins Lymphoma S/P Radiation  -FU with [facility]    #VTE ppx  -SCDS    Thank you for this courtesy consult. Will continue to follow

## 2023-12-11 NOTE — PROGRESS NOTE ADULT - SUBJECTIVE AND OBJECTIVE BOX
Patient is a 48y old  Female who presented to the ED with a chief complaint of active bleeding/ hematoma right breast 12/11/23      HPI:  48F with PMH of AFIB on Xarelto, Hodgkins Lymphoma S/P Radiation, HTN, HLD, Hypothyroidism, Right Breast Cancer S/P recent B/L Mastectomy on 12/1 presents to the ER c/o Right breast pain and bleeding since waking up this morning just prior to arrival.  Patient  has a history of afib on Xarelto (last taken at 9pm last night), breast cancer of the right breast status post bilateral mastectomy with placement of spacers performed December 1 at [facility] [place] with Denzel Pantoja and Tiana.  patient states the right breast is extremely swollen and enlarging.  Previously was similar size to her left breast since she went to bed last night.  denies any trauma to the area.    Patient receieved Kcentra in the ED. She was given 3 units of PRBC and was taken to the OR yesterday with plastics Dr Forbes for  exploration, evacuation of hematoma, suture ligation of active bleeding vessel, placement of hemoblast, drains x2. this morning patient had a hgb in 6.6 range; Hospitalist was called for medical management.      Patient denies any CP, dyspnea, N/V/D, Palpitations, dizziness, dyspnea, Hematemesis, Hematochezia or vaginal Bleeding.    Chief Complaint: Patient is a 48y old  Female who presents with a chief complaint of active bleeding/ hematoma right breast (11 Dec 2023 09:22)        Seen and examined at bedside. Resting comfortably. Mild soreness right chest improved from last evening.   AVSS  Temperature  Temp (F): 99.1 Degrees F  Temp (C) Temp (C): 37.3 Degrees C  Temp site Temp Site: oral    Heart Rate  Heart Rate Heart Rate (beats/min): 87 /min  Heart Rate Method Method: noninvasive blood pressure monitor    Noninvasive Blood Pressure  BP Systolic Systolic: 112 mm Hg  BP Diastolic Diastolic (mm Hg): 53 mm Hg  Blood Pressure - Site Site: left upper arm  Blood Pressure - Method Method: electronic    Respiratory/Pulse Oximetry/Oxygen Therapy  Respiration Rate (breaths/min) Respiration Rate (breaths/min): 18 /min  SpO2 (%) SpO2 (%): 100 %  O2 Delivery/Oxygen Delivery Method Patient On (Oxygen Delivery Method): room air    Chest:  ALEX drains: 1 30cc 2 55cc serosang > sang  Pressure dressing in place: CDI  Ext: SCD's  Patient is a 48y old  Female who presented to the ED with a chief complaint of active bleeding/ hematoma right breast 12/11/23      HPI:  48F with PMH of AFIB on Xarelto, Hodgkins Lymphoma S/P Radiation, HTN, HLD, Hypothyroidism, Right Breast Cancer S/P recent B/L Mastectomy on 12/1 presents to the ER c/o Right breast pain and bleeding since waking up this morning just prior to arrival.  Patient  has a history of afib on Xarelto (last taken at 9pm last night), breast cancer of the right breast status post bilateral mastectomy with placement of spacers performed December 1 at [facility] [place] with Denzel Pantoja and Tiana.  patient states the right breast is extremely swollen and enlarging.  Previously was similar size to her left breast since she went to bed last night.  denies any trauma to the area.    Patient receieved Kcentra in the ED. She was given 3 units of PRBC and was taken to the OR yesterday with plastics Dr Forbes for  exploration, evacuation of hematoma, suture ligation of active bleeding vessel, placement of hemoblast, drains x2. this morning patient had a hgb in 6.6 range; Hospitalist was called for medical management.      Patient denies any CP, dyspnea, N/V/D, Palpitations, dizziness, dyspnea, Hematemesis, Hematochezia or vaginal Bleeding.    Chief Complaint: Patient is a 48y old  Female who presents with a chief complaint of active bleeding/ hematoma right breast (11 Dec 2023 09:22)        Seen and examined at bedside. Resting comfortably. Mild soreness right chest improved from last evening.   AVSS  Temperature  Temp (F): 99.1 Degrees F  Temp (C) Temp (C): 37.3 Degrees C  Temp site Temp Site: oral    Heart Rate  Heart Rate Heart Rate (beats/min): 87 /min  Heart Rate Method Method: noninvasive blood pressure monitor    Noninvasive Blood Pressure  BP Systolic Systolic: 112 mm Hg  BP Diastolic Diastolic (mm Hg): 53 mm Hg  Blood Pressure - Site Site: left upper arm  Blood Pressure - Method Method: electronic    Respiratory/Pulse Oximetry/Oxygen Therapy  Respiration Rate (breaths/min) Respiration Rate (breaths/min): 18 /min  SpO2 (%) SpO2 (%): 100 %  O2 Delivery/Oxygen Delivery Method Patient On (Oxygen Delivery Method): room air    Chest:  ALEX drains: 1 30cc 2 55cc serosang > sang  Pressure dressing in place: CDI  Ext: SCD's

## 2023-12-11 NOTE — PROGRESS NOTE ADULT - ASSESSMENT
POD 1 exploration, evacuation of hematoma, suture ligation of active bleeding vessel, placement of hemoblast, drains x2.  Feeling well. AVSS  Cardiology consult appreciated.  Decreased Hgb/Hct 7.8-6.6 overnight. 2 U PRBC's.   NPO. IV maintenance   Will follow post transfusion Hbg/HCT and q6 hours

## 2023-12-11 NOTE — PROGRESS NOTE ADULT - SUBJECTIVE AND OBJECTIVE BOX
Seen and examined at bedside. Very comfortable. No complaints. Voided multiple x today.   AVSS    Chest: Dressing changed. Wounds CDI.  R breast L drain scant R drain 8cc serosang  Ext: SCD's in place.

## 2023-12-11 NOTE — CONSULT NOTE ADULT - ASSESSMENT
48 F with  bleeding in right breast after surgery ( on anticoagulation)-, PAF, s/p repeat surgery    Currently hemodynamically stable    Will give one dose of lasix  as she is s/p mult units PRBC- keep hb > 8  keep k + > 4 and mg> 2    Xarelto on hold until she is hemodynamically stable      please call as needed

## 2023-12-12 LAB
ANION GAP SERPL CALC-SCNC: 6 MMOL/L — SIGNIFICANT CHANGE UP (ref 5–17)
ANION GAP SERPL CALC-SCNC: 6 MMOL/L — SIGNIFICANT CHANGE UP (ref 5–17)
BUN SERPL-MCNC: 11 MG/DL — SIGNIFICANT CHANGE UP (ref 7–23)
BUN SERPL-MCNC: 11 MG/DL — SIGNIFICANT CHANGE UP (ref 7–23)
CALCIUM SERPL-MCNC: 8.4 MG/DL — LOW (ref 8.5–10.1)
CALCIUM SERPL-MCNC: 8.4 MG/DL — LOW (ref 8.5–10.1)
CHLORIDE SERPL-SCNC: 107 MMOL/L — SIGNIFICANT CHANGE UP (ref 96–108)
CHLORIDE SERPL-SCNC: 107 MMOL/L — SIGNIFICANT CHANGE UP (ref 96–108)
CO2 SERPL-SCNC: 27 MMOL/L — SIGNIFICANT CHANGE UP (ref 22–31)
CO2 SERPL-SCNC: 27 MMOL/L — SIGNIFICANT CHANGE UP (ref 22–31)
CREAT SERPL-MCNC: 0.59 MG/DL — SIGNIFICANT CHANGE UP (ref 0.5–1.3)
CREAT SERPL-MCNC: 0.59 MG/DL — SIGNIFICANT CHANGE UP (ref 0.5–1.3)
EGFR: 112 ML/MIN/1.73M2 — SIGNIFICANT CHANGE UP
EGFR: 112 ML/MIN/1.73M2 — SIGNIFICANT CHANGE UP
GLUCOSE SERPL-MCNC: 129 MG/DL — HIGH (ref 70–99)
GLUCOSE SERPL-MCNC: 129 MG/DL — HIGH (ref 70–99)
HCT VFR BLD CALC: 25.2 % — LOW (ref 34.5–45)
HCT VFR BLD CALC: 25.2 % — LOW (ref 34.5–45)
HCT VFR BLD CALC: 26 % — LOW (ref 34.5–45)
HCT VFR BLD CALC: 26 % — LOW (ref 34.5–45)
HGB BLD-MCNC: 8.3 G/DL — LOW (ref 11.5–15.5)
HGB BLD-MCNC: 8.3 G/DL — LOW (ref 11.5–15.5)
HGB BLD-MCNC: 8.7 G/DL — LOW (ref 11.5–15.5)
HGB BLD-MCNC: 8.7 G/DL — LOW (ref 11.5–15.5)
INR BLD: 0.97 RATIO — SIGNIFICANT CHANGE UP (ref 0.85–1.18)
INR BLD: 0.97 RATIO — SIGNIFICANT CHANGE UP (ref 0.85–1.18)
MAGNESIUM SERPL-MCNC: 1.9 MG/DL — SIGNIFICANT CHANGE UP (ref 1.6–2.6)
MAGNESIUM SERPL-MCNC: 1.9 MG/DL — SIGNIFICANT CHANGE UP (ref 1.6–2.6)
MCHC RBC-ENTMCNC: 30.2 PG — SIGNIFICANT CHANGE UP (ref 27–34)
MCHC RBC-ENTMCNC: 30.2 PG — SIGNIFICANT CHANGE UP (ref 27–34)
MCHC RBC-ENTMCNC: 30.6 PG — SIGNIFICANT CHANGE UP (ref 27–34)
MCHC RBC-ENTMCNC: 30.6 PG — SIGNIFICANT CHANGE UP (ref 27–34)
MCHC RBC-ENTMCNC: 32.9 GM/DL — SIGNIFICANT CHANGE UP (ref 32–36)
MCHC RBC-ENTMCNC: 32.9 GM/DL — SIGNIFICANT CHANGE UP (ref 32–36)
MCHC RBC-ENTMCNC: 33.5 GM/DL — SIGNIFICANT CHANGE UP (ref 32–36)
MCHC RBC-ENTMCNC: 33.5 GM/DL — SIGNIFICANT CHANGE UP (ref 32–36)
MCV RBC AUTO: 91.5 FL — SIGNIFICANT CHANGE UP (ref 80–100)
MCV RBC AUTO: 91.5 FL — SIGNIFICANT CHANGE UP (ref 80–100)
MCV RBC AUTO: 91.6 FL — SIGNIFICANT CHANGE UP (ref 80–100)
MCV RBC AUTO: 91.6 FL — SIGNIFICANT CHANGE UP (ref 80–100)
PHOSPHATE SERPL-MCNC: 4.1 MG/DL — SIGNIFICANT CHANGE UP (ref 2.5–4.5)
PHOSPHATE SERPL-MCNC: 4.1 MG/DL — SIGNIFICANT CHANGE UP (ref 2.5–4.5)
PLATELET # BLD AUTO: 245 K/UL — SIGNIFICANT CHANGE UP (ref 150–400)
PLATELET # BLD AUTO: 245 K/UL — SIGNIFICANT CHANGE UP (ref 150–400)
PLATELET # BLD AUTO: 257 K/UL — SIGNIFICANT CHANGE UP (ref 150–400)
PLATELET # BLD AUTO: 257 K/UL — SIGNIFICANT CHANGE UP (ref 150–400)
POTASSIUM SERPL-MCNC: 3.6 MMOL/L — SIGNIFICANT CHANGE UP (ref 3.5–5.3)
POTASSIUM SERPL-MCNC: 3.6 MMOL/L — SIGNIFICANT CHANGE UP (ref 3.5–5.3)
POTASSIUM SERPL-SCNC: 3.6 MMOL/L — SIGNIFICANT CHANGE UP (ref 3.5–5.3)
POTASSIUM SERPL-SCNC: 3.6 MMOL/L — SIGNIFICANT CHANGE UP (ref 3.5–5.3)
PROTHROM AB SERPL-ACNC: 11 SEC — SIGNIFICANT CHANGE UP (ref 9.5–13)
PROTHROM AB SERPL-ACNC: 11 SEC — SIGNIFICANT CHANGE UP (ref 9.5–13)
RBC # BLD: 2.75 M/UL — LOW (ref 3.8–5.2)
RBC # BLD: 2.75 M/UL — LOW (ref 3.8–5.2)
RBC # BLD: 2.84 M/UL — LOW (ref 3.8–5.2)
RBC # BLD: 2.84 M/UL — LOW (ref 3.8–5.2)
RBC # FLD: 17.4 % — HIGH (ref 10.3–14.5)
RBC # FLD: 17.4 % — HIGH (ref 10.3–14.5)
RBC # FLD: 17.6 % — HIGH (ref 10.3–14.5)
RBC # FLD: 17.6 % — HIGH (ref 10.3–14.5)
SODIUM SERPL-SCNC: 140 MMOL/L — SIGNIFICANT CHANGE UP (ref 135–145)
SODIUM SERPL-SCNC: 140 MMOL/L — SIGNIFICANT CHANGE UP (ref 135–145)
WBC # BLD: 10.95 K/UL — HIGH (ref 3.8–10.5)
WBC # BLD: 10.95 K/UL — HIGH (ref 3.8–10.5)
WBC # BLD: 13.4 K/UL — HIGH (ref 3.8–10.5)
WBC # BLD: 13.4 K/UL — HIGH (ref 3.8–10.5)
WBC # FLD AUTO: 10.95 K/UL — HIGH (ref 3.8–10.5)
WBC # FLD AUTO: 10.95 K/UL — HIGH (ref 3.8–10.5)
WBC # FLD AUTO: 13.4 K/UL — HIGH (ref 3.8–10.5)
WBC # FLD AUTO: 13.4 K/UL — HIGH (ref 3.8–10.5)

## 2023-12-12 RX ADMIN — Medication 150 MICROGRAM(S): at 05:29

## 2023-12-12 RX ADMIN — MORPHINE SULFATE 2 MILLIGRAM(S): 50 CAPSULE, EXTENDED RELEASE ORAL at 09:52

## 2023-12-12 RX ADMIN — Medication 650 MILLIGRAM(S): at 11:36

## 2023-12-12 RX ADMIN — MORPHINE SULFATE 2 MILLIGRAM(S): 50 CAPSULE, EXTENDED RELEASE ORAL at 05:29

## 2023-12-12 RX ADMIN — MORPHINE SULFATE 2 MILLIGRAM(S): 50 CAPSULE, EXTENDED RELEASE ORAL at 21:12

## 2023-12-12 RX ADMIN — PANTOPRAZOLE SODIUM 40 MILLIGRAM(S): 20 TABLET, DELAYED RELEASE ORAL at 05:27

## 2023-12-12 RX ADMIN — Medication 1000 UNIT(S): at 11:36

## 2023-12-12 RX ADMIN — Medication 1000 MILLIGRAM(S): at 00:07

## 2023-12-12 RX ADMIN — Medication 50 MILLIGRAM(S): at 11:36

## 2023-12-12 RX ADMIN — Medication 650 MILLIGRAM(S): at 12:06

## 2023-12-12 RX ADMIN — Medication 1000 MILLIGRAM(S): at 17:51

## 2023-12-12 RX ADMIN — MORPHINE SULFATE 2 MILLIGRAM(S): 50 CAPSULE, EXTENDED RELEASE ORAL at 16:30

## 2023-12-12 RX ADMIN — MORPHINE SULFATE 2 MILLIGRAM(S): 50 CAPSULE, EXTENDED RELEASE ORAL at 15:55

## 2023-12-12 RX ADMIN — Medication 1000 MILLIGRAM(S): at 09:51

## 2023-12-12 RX ADMIN — ATORVASTATIN CALCIUM 20 MILLIGRAM(S): 80 TABLET, FILM COATED ORAL at 21:12

## 2023-12-12 RX ADMIN — MORPHINE SULFATE 2 MILLIGRAM(S): 50 CAPSULE, EXTENDED RELEASE ORAL at 09:46

## 2023-12-12 NOTE — PROGRESS NOTE ADULT - ASSESSMENT
POD 2 S/P Exploration, evacuation of hematoma, wash out, suture ligation of active bleeding vessel.  Doing well. + Hgb/Hct trend  Continue pressure dressing.  Plan for DC in am if stable clinical criteria met

## 2023-12-12 NOTE — PROGRESS NOTE ADULT - ASSESSMENT
A/P   S/p evacuation of large hematoma/suture ligation of active bleeding vessel right chest.   Cont to monitor H+H trend.  Cont ALEX drains to bulb suction  Cont Chest compression

## 2023-12-12 NOTE — PROGRESS NOTE ADULT - SUBJECTIVE AND OBJECTIVE BOX
Seen and examined at bedside. Doing very well. No complaints. Tolerating reg diet. VF  AVSS  Chest: Dressing changed and surgical bra placed. Wounds CDI. No masses. No collections.              Mild bruising.  Ext: SCD's

## 2023-12-12 NOTE — PROGRESS NOTE ADULT - SUBJECTIVE AND OBJECTIVE BOX
Resting comfortably. No complaints. "Feels fine."   AVSS    Chest: Dressing in place. CDI  ALEX drains right chest: L 10cc R 20cc serosang     Ext: SCD's in place      Hgb 8.6-8.3

## 2023-12-13 ENCOUNTER — TRANSCRIPTION ENCOUNTER (OUTPATIENT)
Age: 48
End: 2023-12-13

## 2023-12-13 VITALS — DIASTOLIC BLOOD PRESSURE: 73 MMHG | HEART RATE: 80 BPM | SYSTOLIC BLOOD PRESSURE: 130 MMHG

## 2023-12-13 LAB
HCT VFR BLD CALC: 27.2 % — LOW (ref 34.5–45)
HCT VFR BLD CALC: 27.2 % — LOW (ref 34.5–45)
HGB BLD-MCNC: 9.1 G/DL — LOW (ref 11.5–15.5)
HGB BLD-MCNC: 9.1 G/DL — LOW (ref 11.5–15.5)
MCHC RBC-ENTMCNC: 30.8 PG — SIGNIFICANT CHANGE UP (ref 27–34)
MCHC RBC-ENTMCNC: 30.8 PG — SIGNIFICANT CHANGE UP (ref 27–34)
MCHC RBC-ENTMCNC: 33.5 GM/DL — SIGNIFICANT CHANGE UP (ref 32–36)
MCHC RBC-ENTMCNC: 33.5 GM/DL — SIGNIFICANT CHANGE UP (ref 32–36)
MCV RBC AUTO: 92.2 FL — SIGNIFICANT CHANGE UP (ref 80–100)
MCV RBC AUTO: 92.2 FL — SIGNIFICANT CHANGE UP (ref 80–100)
PLATELET # BLD AUTO: 311 K/UL — SIGNIFICANT CHANGE UP (ref 150–400)
PLATELET # BLD AUTO: 311 K/UL — SIGNIFICANT CHANGE UP (ref 150–400)
RBC # BLD: 2.95 M/UL — LOW (ref 3.8–5.2)
RBC # BLD: 2.95 M/UL — LOW (ref 3.8–5.2)
RBC # FLD: 16.8 % — HIGH (ref 10.3–14.5)
RBC # FLD: 16.8 % — HIGH (ref 10.3–14.5)
WBC # BLD: 11.77 K/UL — HIGH (ref 3.8–10.5)
WBC # BLD: 11.77 K/UL — HIGH (ref 3.8–10.5)
WBC # FLD AUTO: 11.77 K/UL — HIGH (ref 3.8–10.5)
WBC # FLD AUTO: 11.77 K/UL — HIGH (ref 3.8–10.5)

## 2023-12-13 PROCEDURE — 99239 HOSP IP/OBS DSCHRG MGMT >30: CPT

## 2023-12-13 RX ORDER — FUROSEMIDE 40 MG
40 TABLET ORAL ONCE
Refills: 0 | Status: COMPLETED | OUTPATIENT
Start: 2023-12-13 | End: 2023-12-13

## 2023-12-13 RX ORDER — ACETAMINOPHEN 500 MG
2 TABLET ORAL
Qty: 0 | Refills: 0 | DISCHARGE
Start: 2023-12-13

## 2023-12-13 RX ORDER — CEPHALEXIN 500 MG
1 CAPSULE ORAL
Qty: 10 | Refills: 0
Start: 2023-12-13 | End: 2023-12-17

## 2023-12-13 RX ORDER — HYDROMORPHONE HYDROCHLORIDE 2 MG/ML
1 INJECTION INTRAMUSCULAR; INTRAVENOUS; SUBCUTANEOUS
Refills: 0 | DISCHARGE

## 2023-12-13 RX ORDER — RIVAROXABAN 15 MG-20MG
1 KIT ORAL
Refills: 0 | DISCHARGE

## 2023-12-13 RX ADMIN — Medication 650 MILLIGRAM(S): at 05:28

## 2023-12-13 RX ADMIN — Medication 1000 UNIT(S): at 09:21

## 2023-12-13 RX ADMIN — Medication 40 MILLIGRAM(S): at 09:21

## 2023-12-13 RX ADMIN — Medication 150 MICROGRAM(S): at 05:24

## 2023-12-13 RX ADMIN — Medication 50 MILLIGRAM(S): at 09:21

## 2023-12-13 RX ADMIN — PANTOPRAZOLE SODIUM 40 MILLIGRAM(S): 20 TABLET, DELAYED RELEASE ORAL at 05:24

## 2023-12-13 RX ADMIN — Medication 1000 MILLIGRAM(S): at 05:24

## 2023-12-13 NOTE — PROGRESS NOTE ADULT - ASSESSMENT
48 F with  bleeding in right breast after surgery ( on anticoagulation)-, PAF, s/p repeat surgery    Currently hemodynamically stable    Will give one dose of lasix today  as she is s/p mult units PRBC- keep hb > 8  keep k + > 4 and mg> 2    Xarelto on hold until she is hemodynamically stable --  CHADSVASC 3---- gender, HTN, coronary disease ( sub clinical )---  risk of stoke/ embolism 3-5 % yearly risk-- given recent significant bleed req transfusions-  reasonable to hold for up to 1 week  post surgery       If Hb stable--- DC planning

## 2023-12-13 NOTE — DISCHARGE NOTE NURSING/CASE MANAGEMENT/SOCIAL WORK - PATIENT PORTAL LINK FT
You can access the FollowMyHealth Patient Portal offered by [facility] by registering at the following website: [email or web address]. By joining [employer]’s FollowMyHealth portal, you will also be able to view your health information using other applications (apps) compatible with our system. You can access the FollowMyHealth Patient Portal offered by [facility] by registering at the following website: [email or web address]. By joining [employer]’s FollowMyHealth portal, you will also be able to view your health information using other applications (apps) compatible with our system.

## 2023-12-13 NOTE — DISCHARGE NOTE PROVIDER - NSDCMRMEDTOKEN_GEN_ALL_CORE_FT
Called and spoke with patient who reports that she has fallen 4 times in the past month. She fell last week and landed on both knees. Her right knee is still very painful. The swelling has decreased and she denies any redness or warmth to the touch. She reports that there is a constant burning pain since the fall. She has tried ice and Tylenol every 4 hours. Advised that patient have knee evaluated. Patient agreeable and scheduled appointment for evaluation with PCP tomorrow morning. Advised UC if pain worsens. Patient verbalizes understanding. acetaminophen 325 mg oral tablet: 2 tab(s) orally every 6 hours As needed Temp greater or equal to 38C (100.4F), Mild Pain (1 - 3)  amLODIPine 5 mg oral tablet: 1 tab(s) orally once a day  Caltrate 600 mg oral tablet: 2 tab(s) orally once a day  cephalexin 500 mg oral tablet: 1 tab(s) orally every 12 hours  cholecalciferol 25 mcg (1000 intl units) oral tablet: 1 tab(s) orally once a day  Crestor 5 mg oral tablet: 1 tab(s) orally once a day  levothyroxine 137 mcg (0.137 mg) oral tablet: 1 tab(s) orally once a day  LORazepam 0.5 mg oral tablet: 1 tab(s) orally once a day (at bedtime) as needed  magnesium oxide 400 mg oral tablet: 1 tab(s) orally once a day  metoprolol succinate 50 mg oral tablet, extended release: 1 tab(s) orally once a day

## 2023-12-13 NOTE — DISCHARGE NOTE PROVIDER - CARE PROVIDER_API CALL
Juwan Forbes  Plastic Surgery  [address] [facility], [address] [place] [address]  Phone: [phone]  Fax: [phone]  Follow Up Time: 1 week    ISABEL,   Phone: (   )    -  Fax: (   )    -  Scheduled Appointment: 12/15/2023   Juwan Forbes  Plastic Surgery  [address] [facility], [address] [place] [id number]  Phone: [phone]  Fax: [phone]  Follow Up Time: 1 week    ISBAEL,   Phone: (   )    -  Fax: (   )    -  Scheduled Appointment: 12/15/2023

## 2023-12-13 NOTE — DISCHARGE NOTE PROVIDER - NSDCCPTREATMENT_GEN_ALL_CORE_FT
PRINCIPAL PROCEDURE  Procedure: Evacuation of hematoma of right breast  Findings and Treatment:       SECONDARY PROCEDURE  Procedure: Removal of breast implant  Findings and Treatment:

## 2023-12-13 NOTE — DISCHARGE NOTE NURSING/CASE MANAGEMENT/SOCIAL WORK - NSDCPEFALRISK_GEN_ALL_CORE
For information on Fall & Injury Prevention, visit: [email or web address] OR  [email or web address] OR  https://www.cdc.gov/steadi/patient.html For information on Fall & Injury Prevention, visit: [email or web address] OR  [email or web address] OR  https://www.cdc.gov/steadi/patient.html

## 2023-12-13 NOTE — DISCHARGE NOTE PROVIDER - PROVIDER TOKENS
PROVIDER:[TOKEN:[[id number]:[id number]],FOLLOWUP:[1 week]],FREE:[LAST:[MSK],PHONE:[(   )    -],FAX:[(   )    -],SCHEDULEDAPPT:[12/15/2023]]

## 2023-12-13 NOTE — PROGRESS NOTE ADULT - SUBJECTIVE AND OBJECTIVE BOX
CHIEF COMPLAINT: Patient is a 48y old  Female who presents with a chief complaint of     HPI:· 48F p/w Right breast pain and bleeding .  Patient  has a history of afib on Xarelto (last taken at 9pm night prior to admission), breast cancer of the right breast status post bilateral mastectomy with placement of spacers performed  at [facility] [place] with Denzel Pantoja and Tiana.  patient states the right breast is extremely swollen and enlarging.  Previously was similar size to her left breast since she went to bed last night.  denies any trauma to the area.      23 s/p surgery yesterday to suture bleeding artery and remove  spacer  23-- improved Hb    PMHx: PAST MEDICAL & SURGICAL HISTORY:  HTN (hypertension)      HLD (hyperlipidemia)      Afib      Non Hodgkin's lymphoma      Breast cancer      History of ankle surgery      H/O tubal ligation      H/O  section            Soc Hx: no smoking,  wine  , children      Allergies: Allergies    Augmentin (Rash)    Intolerances      Vital Signs Last 24 Hrs  T(C): 36.8 (12 Dec 2023 21:10), Max: 36.8 (12 Dec 2023 21:10)  T(F): 98.3 (12 Dec 2023 21:10), Max: 98.3 (12 Dec 2023 21:10)  HR: 91 (12 Dec 2023 21:10) (80 - 91)  BP: 121/59 (12 Dec 2023 21:10) (121/59 - 128/66)  BP(mean): 79 (12 Dec 2023 11:34) (79 - 79)  RR: 18 (12 Dec 2023 21:10) (18 - 18)  SpO2: 98% (12 Dec 2023 21:10) (94% - 98%)    Parameters below as of 12 Dec 2023 07:53  Patient On (Oxygen Delivery Method): room air            PHYSICAL EXAM:   Constitutional: NAD, awake and alert, well-developed  HEENT: PERR, EOMI, Normal Hearing, MMM  Neck: Soft and supple, No LAD, No JVD  Respiratory: Breath sounds are clear bilaterally, No wheezing, rales or rhonchi  Cardiovascular: S1 and S2, regular rate and rhythm, no Murmurs, gallops or rubs  Gastrointestinal: Bowel Sounds present, soft, nontender, nondistended, no guarding, no rebound  Extremities: No peripheral edema  Vascular: 2+ peripheral pulses  Neurological: A/O x 3, no focal deficits  Musculoskeletal: 5/5 strength b/l upper and lower extremities  Skin: No rashes        Vital Signs Last 24 Hrs  T(C): 36.8 (12 Dec 2023 21:10), Max: 36.8 (12 Dec 2023 21:10)  T(F): 98.3 (12 Dec 2023 21:10), Max: 98.3 (12 Dec 2023 21:10)  HR: 91 (12 Dec 2023 21:10) (80 - 91)  BP: 121/59 (12 Dec 2023 21:10) (121/59 - 128/66)  BP(mean): 79 (12 Dec 2023 11:34) (79 - 79)  RR: 18 (12 Dec 2023 21:10) (18 - 18)  SpO2: 98% (12 Dec 2023 21:10) (94% - 98%)    Parameters below as of 12 Dec 2023 07:53  Patient On (Oxygen Delivery Method): room air            LABS: All Labs Reviewed:                                         8.7    10.95 )-----------( 245      ( 12 Dec 2023 13:07 )             26.0   12-12    140  |  107  |  11  ----------------------------<  129<H>  3.6   |  27  |  0.59    Ca    8.4<L>      12 Dec 2023 07:07  Phos  4.1     12-12  Mg     1.9     12-12          RADIOLOGY:< from: CT Chest w/ IV Cont (12.10.23 @ 07:05) >    IMPRESSION:  Active arterial extravasation within the upper-inner quadrant of the   right breast with large hematoma surrounding the ruptured right breast   implant.    Findings were discussed with Dr. KRISTIAN MORA [id number] 12/10/2023   7:08 AM by Dr. Phillips withread back confirmation.    --- End of Report ---            ROBERTO PHILLIPS MD; Attending Radiologist  This document has been electronically signed. Dec 10 2023  7:17AM    < end of copied text >      EKG:< from: 12 Lead ECG (12.10.23 @ 07:26) >    Diagnosis Line Normal sinus rhythm  Normal ECG  No previous ECGs available  Confirmed by Palla MD, Aftab ([id number]) on 12/10/2023 3:03:29 PM    < end of copied text >      Telemetry: SR           CHIEF COMPLAINT: Patient is a 48y old  Female who presents with a chief complaint of     HPI:· 48F p/w Right breast pain and bleeding .  Patient  has a history of afib on Xarelto (last taken at 9pm night prior to admission), breast cancer of the right breast status post bilateral mastectomy with placement of spacers performed  at [facility] [place] with Denzel Pantoja and Tiana.  patient states the right breast is extremely swollen and enlarging.  Previously was similar size to her left breast since she went to bed last night.  denies any trauma to the area.      23 s/p surgery yesterday to suture bleeding artery and remove  spacer  23-- improved Hb    PMHx: PAST MEDICAL & SURGICAL HISTORY:  HTN (hypertension)      HLD (hyperlipidemia)      Afib      Non Hodgkin's lymphoma      Breast cancer      History of ankle surgery      H/O tubal ligation      H/O  section            Soc Hx: no smoking,  wine  , children      Allergies: Allergies    Augmentin (Rash)    Intolerances      Vital Signs Last 24 Hrs  T(C): 36.8 (12 Dec 2023 21:10), Max: 36.8 (12 Dec 2023 21:10)  T(F): 98.3 (12 Dec 2023 21:10), Max: 98.3 (12 Dec 2023 21:10)  HR: 91 (12 Dec 2023 21:10) (80 - 91)  BP: 121/59 (12 Dec 2023 21:10) (121/59 - 128/66)  BP(mean): 79 (12 Dec 2023 11:34) (79 - 79)  RR: 18 (12 Dec 2023 21:10) (18 - 18)  SpO2: 98% (12 Dec 2023 21:10) (94% - 98%)    Parameters below as of 12 Dec 2023 07:53  Patient On (Oxygen Delivery Method): room air            PHYSICAL EXAM:   Constitutional: NAD, awake and alert, well-developed  HEENT: PERR, EOMI, Normal Hearing, MMM  Neck: Soft and supple, No LAD, No JVD  Respiratory: Breath sounds are clear bilaterally, No wheezing, rales or rhonchi  Cardiovascular: S1 and S2, regular rate and rhythm, no Murmurs, gallops or rubs  Gastrointestinal: Bowel Sounds present, soft, nontender, nondistended, no guarding, no rebound  Extremities: No peripheral edema  Vascular: 2+ peripheral pulses  Neurological: A/O x 3, no focal deficits  Musculoskeletal: 5/5 strength b/l upper and lower extremities  Skin: No rashes        Vital Signs Last 24 Hrs  T(C): 36.8 (12 Dec 2023 21:10), Max: 36.8 (12 Dec 2023 21:10)  T(F): 98.3 (12 Dec 2023 21:10), Max: 98.3 (12 Dec 2023 21:10)  HR: 91 (12 Dec 2023 21:10) (80 - 91)  BP: 121/59 (12 Dec 2023 21:10) (121/59 - 128/66)  BP(mean): 79 (12 Dec 2023 11:34) (79 - 79)  RR: 18 (12 Dec 2023 21:10) (18 - 18)  SpO2: 98% (12 Dec 2023 21:10) (94% - 98%)    Parameters below as of 12 Dec 2023 07:53  Patient On (Oxygen Delivery Method): room air            LABS: All Labs Reviewed:                                         8.7    10.95 )-----------( 245      ( 12 Dec 2023 13:07 )             26.0   12-12    140  |  107  |  11  ----------------------------<  129<H>  3.6   |  27  |  0.59    Ca    8.4<L>      12 Dec 2023 07:07  Phos  4.1     12-12  Mg     1.9     12-12          RADIOLOGY:< from: CT Chest w/ IV Cont (12.10.23 @ 07:05) >    IMPRESSION:  Active arterial extravasation within the upper-inner quadrant of the   right breast with large hematoma surrounding the ruptured right breast   implant.    Findings were discussed with Dr. KRISTIAN MORA [phone] 12/10/2023   7:08 AM by Dr. Phillips withread back confirmation.    --- End of Report ---            ROBERTO PHILLIPS MD; Attending Radiologist  This document has been electronically signed. Dec 10 2023  7:17AM    < end of copied text >      EKG:< from: 12 Lead ECG (12.10.23 @ 07:26) >    Diagnosis Line Normal sinus rhythm  Normal ECG  No previous ECGs available  Confirmed by Palla MD, Aftab ([id number]) on 12/10/2023 3:03:29 PM    < end of copied text >      Telemetry: SR

## 2023-12-13 NOTE — PROGRESS NOTE ADULT - REASON FOR ADMISSION
Active bleeding/hematoma right breast
bleeding
active bleeding/ hematoma right breast
active bleeding/hematoma right breats s/p breast reconstruction
active bleeding, right breast hematoma
Active bleeding/expanding hematoma right breast

## 2023-12-13 NOTE — DISCHARGE NOTE PROVIDER - NSDCCPCAREPLAN_GEN_ALL_CORE_FT
PRINCIPAL DISCHARGE DIAGNOSIS  Diagnosis: Active bleeding  Assessment and Plan of Treatment: Removal of breast implant, 500cc hematoma evacuated from right breast, Stable

## 2023-12-13 NOTE — DISCHARGE NOTE PROVIDER - HOSPITAL COURSE
48F a/w Right breast pain and bleeding.  Patient  has a history of afib on Xarelto (last taken at 9pm night prior to admission), breast cancer of the right breast status post bilateral mastectomy with placement of spacers performed December 1 at [facility] with Denzel Pantoja and Tiana.  patient states the right breast is extremely swollen and enlarging.  Previously was similar size to her left breast since she went to bed last night. S/P Removal of breast implant, 500cc hematoma evacuated from right breast. 2JP drain in Right breast and 1 in left breast. Hgb stable.     Stable to DC Home D/W Dr. Forbes, Pt will f/u on Fri at [facility] and F/U with Dr. Forbes in next week.   48F a/w Right breast pain and bleeding.  Patient  has a history of afib on Xarelto (last taken at 9pm night prior to admission), breast cancer of the right breast status post bilateral mastectomy with placement of spacers performed December 1 at [facility] with Denzel Pantoja and Tiana.  patient states the right breast is extremely swollen and enlarging.  Previously was similar size to her left breast since she went to bed last night. S/P Removal of breast implant, 500cc hematoma evacuated from right breast. 2JP drain in Right breast and 1 in left breast. Hgb stable.     Stable to DC Home D/W Dr. Forbes, Pt will f/u on Fri at [facility] [place] [facility] and F/U with Dr. Forbes in next week.   48F a/w Right breast pain and bleeding.  Patient  has a history of afib on Xarelto (last taken at 9pm night prior to admission), breast cancer of the right breast status post bilateral mastectomy with placement of spacers performed December 1 at [facility] with Denzel Pantoja and Tiana.  patient states the right breast is extremely swollen and enlarging.  Previously was similar size to her left breast since she went to bed last night. S/P Removal of breast implant, 500cc hematoma evacuated from right breast. 2JP drain in Right breast and 1 in left breast. Hgb stable.     Stable to DC Home D/W Dr. Forbes, Pt will f/u on Fri at [facility] and F/U with Dr. Forbes in next week.    40 minutes assessing presenting problems of acute illness,  as well as medical decision making including initiating plan of care, reviewing data, discussing goals of care with patient/family, and writing this note. 48F a/w Right breast pain and bleeding.  Patient  has a history of afib on Xarelto (last taken at 9pm night prior to admission), breast cancer of the right breast status post bilateral mastectomy with placement of spacers performed December 1 at [facility] with Denzel Pantoja and Tiana.  patient states the right breast is extremely swollen and enlarging.  Previously was similar size to her left breast since she went to bed last night. S/P Removal of breast implant, 500cc hematoma evacuated from right breast. 2JP drain in Right breast and 1 in left breast. Hgb stable.     Stable to DC Home D/W Dr. Forbes, Pt will f/u on Fri at [facility] [place] and F/U with Dr. Forbes in next week.    40 minutes assessing presenting problems of acute illness,  as well as medical decision making including initiating plan of care, reviewing data, discussing goals of care with patient/family, and writing this note.

## 2023-12-15 DIAGNOSIS — C50.911 MALIGNANT NEOPLASM OF UNSPECIFIED SITE OF RIGHT FEMALE BREAST: ICD-10-CM
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
## 2023-12-15 DIAGNOSIS — I48.0 PAROXYSMAL ATRIAL FIBRILLATION: ICD-10-CM

## 2023-12-15 DIAGNOSIS — Z79.01 LONG TERM (CURRENT) USE OF ANTICOAGULANTS: ICD-10-CM

## 2023-12-15 DIAGNOSIS — C85.90 NON-HODGKIN LYMPHOMA, UNSPECIFIED, UNSPECIFIED SITE: ICD-10-CM
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
## 2023-12-15 DIAGNOSIS — L76.32 POSTPROCEDURAL HEMATOMA OF SKIN AND SUBCUTANEOUS TISSUE FOLLOWING OTHER PROCEDURE: ICD-10-CM

## 2023-12-15 DIAGNOSIS — Z79.890 HORMONE REPLACEMENT THERAPY: ICD-10-CM

## 2023-12-15 DIAGNOSIS — I97.620 POSTPROCEDURAL HEMORRHAGE OF A CIRCULATORY SYSTEM ORGAN OR STRUCTURE FOLLOWING OTHER PROCEDURE: ICD-10-CM

## 2023-12-15 DIAGNOSIS — D62 ACUTE POSTHEMORRHAGIC ANEMIA: ICD-10-CM

## 2023-12-15 DIAGNOSIS — D72.829 ELEVATED WHITE BLOOD CELL COUNT, UNSPECIFIED: ICD-10-CM

## 2023-12-15 DIAGNOSIS — E78.5 HYPERLIPIDEMIA, UNSPECIFIED: ICD-10-CM

## 2024-01-17 ENCOUNTER — APPOINTMENT (OUTPATIENT)
Dept: ELECTROPHYSIOLOGY | Facility: CLINIC | Age: 49
End: 2024-01-17

## 2024-05-02 ENCOUNTER — RX RENEWAL (OUTPATIENT)
Age: 49
End: 2024-05-02

## 2024-05-06 ENCOUNTER — RX RENEWAL (OUTPATIENT)
Age: 49
End: 2024-05-06

## 2024-05-07 RX ORDER — METOPROLOL SUCCINATE 50 MG/1
50 TABLET, EXTENDED RELEASE ORAL DAILY
Qty: 90 | Refills: 0 | Status: ACTIVE | COMMUNITY
Start: 1900-01-01 | End: 1900-01-01

## 2024-08-05 ENCOUNTER — RX RENEWAL (OUTPATIENT)
Age: 49
End: 2024-08-05

## 2024-11-04 ENCOUNTER — RX RENEWAL (OUTPATIENT)
Age: 49
End: 2024-11-04

## 2025-05-07 NOTE — ED STATDOCS - CARE PLAN
Health Maintenance       COVID-19 Vaccine (5 - 2024-25 season)  Overdue since 9/1/2024    Hepatitis A Vaccine (1 of 2 - Risk 2-dose series)  Never done    Hepatitis B Vaccine (2 of 3 - 19+ 3-dose series)  Postponed until 7/25/2025           Following review of the above:  Patient is not proceeding with: COVID-19, Hep A, and Hep B    Note: Refer to final orders and clinician documentation.         Principal Discharge DX:	Congestion of nasal sinus

## 2025-07-16 ENCOUNTER — EMERGENCY (EMERGENCY)
Facility: HOSPITAL | Age: 50
LOS: 0 days | Discharge: HOME | End: 2025-07-16
Attending: EMERGENCY MEDICINE
Payer: COMMERCIAL

## 2025-07-16 VITALS
DIASTOLIC BLOOD PRESSURE: 83 MMHG | OXYGEN SATURATION: 98 % | SYSTOLIC BLOOD PRESSURE: 141 MMHG | RESPIRATION RATE: 18 BRPM | HEART RATE: 78 BPM | TEMPERATURE: 99 F

## 2025-07-16 VITALS
RESPIRATION RATE: 18 BRPM | TEMPERATURE: 98 F | HEART RATE: 80 BPM | OXYGEN SATURATION: 98 % | DIASTOLIC BLOOD PRESSURE: 92 MMHG | SYSTOLIC BLOOD PRESSURE: 157 MMHG | WEIGHT: 209.66 LBS

## 2025-07-16 DIAGNOSIS — Z88.1 ALLERGY STATUS TO OTHER ANTIBIOTIC AGENTS: ICD-10-CM

## 2025-07-16 DIAGNOSIS — Z98.51 TUBAL LIGATION STATUS: Chronic | ICD-10-CM

## 2025-07-16 DIAGNOSIS — R07.81 PLEURODYNIA: ICD-10-CM

## 2025-07-16 DIAGNOSIS — Z85.72 PERSONAL HISTORY OF NON-HODGKIN LYMPHOMAS: ICD-10-CM

## 2025-07-16 DIAGNOSIS — Z85.3 PERSONAL HISTORY OF MALIGNANT NEOPLASM OF BREAST: ICD-10-CM

## 2025-07-16 DIAGNOSIS — Z98.890 OTHER SPECIFIED POSTPROCEDURAL STATES: Chronic | ICD-10-CM

## 2025-07-16 DIAGNOSIS — Z98.891 HISTORY OF UTERINE SCAR FROM PREVIOUS SURGERY: Chronic | ICD-10-CM

## 2025-07-16 DIAGNOSIS — I48.0 PAROXYSMAL ATRIAL FIBRILLATION: ICD-10-CM

## 2025-07-16 LAB
ALBUMIN SERPL ELPH-MCNC: 4 G/DL — SIGNIFICANT CHANGE UP (ref 3.3–5)
ALP SERPL-CCNC: 104 U/L — SIGNIFICANT CHANGE UP (ref 40–120)
ALT FLD-CCNC: 49 U/L — SIGNIFICANT CHANGE UP (ref 12–78)
ANION GAP SERPL CALC-SCNC: 6 MMOL/L — SIGNIFICANT CHANGE UP (ref 5–17)
AST SERPL-CCNC: 21 U/L — SIGNIFICANT CHANGE UP (ref 15–37)
BASOPHILS # BLD AUTO: 0.07 K/UL — SIGNIFICANT CHANGE UP (ref 0–0.2)
BASOPHILS NFR BLD AUTO: 0.7 % — SIGNIFICANT CHANGE UP (ref 0–2)
BILIRUB SERPL-MCNC: 0.3 MG/DL — SIGNIFICANT CHANGE UP (ref 0.2–1.2)
BUN SERPL-MCNC: 10 MG/DL — SIGNIFICANT CHANGE UP (ref 7–23)
CALCIUM SERPL-MCNC: 9.8 MG/DL — SIGNIFICANT CHANGE UP (ref 8.5–10.1)
CHLORIDE SERPL-SCNC: 102 MMOL/L — SIGNIFICANT CHANGE UP (ref 96–108)
CO2 SERPL-SCNC: 27 MMOL/L — SIGNIFICANT CHANGE UP (ref 22–31)
CREAT SERPL-MCNC: 0.75 MG/DL — SIGNIFICANT CHANGE UP (ref 0.5–1.3)
EGFR: 98 ML/MIN/1.73M2 — SIGNIFICANT CHANGE UP
EGFR: 98 ML/MIN/1.73M2 — SIGNIFICANT CHANGE UP
EOSINOPHIL # BLD AUTO: 0.37 K/UL — SIGNIFICANT CHANGE UP (ref 0–0.5)
EOSINOPHIL NFR BLD AUTO: 3.8 % — SIGNIFICANT CHANGE UP (ref 0–6)
GLUCOSE SERPL-MCNC: 91 MG/DL — SIGNIFICANT CHANGE UP (ref 70–99)
HCG SERPL-ACNC: 2 MIU/ML — SIGNIFICANT CHANGE UP
HCT VFR BLD CALC: 42.4 % — SIGNIFICANT CHANGE UP (ref 34.5–45)
HGB BLD-MCNC: 14 G/DL — SIGNIFICANT CHANGE UP (ref 11.5–15.5)
IMM GRANULOCYTES # BLD AUTO: 0.04 K/UL — SIGNIFICANT CHANGE UP (ref 0–0.07)
IMM GRANULOCYTES NFR BLD AUTO: 0.4 % — SIGNIFICANT CHANGE UP (ref 0–0.9)
LYMPHOCYTES # BLD AUTO: 2.95 K/UL — SIGNIFICANT CHANGE UP (ref 1–3.3)
LYMPHOCYTES NFR BLD AUTO: 30.7 % — SIGNIFICANT CHANGE UP (ref 13–44)
MCHC RBC-ENTMCNC: 31.5 PG — SIGNIFICANT CHANGE UP (ref 27–34)
MCHC RBC-ENTMCNC: 33 G/DL — SIGNIFICANT CHANGE UP (ref 32–36)
MCV RBC AUTO: 95.5 FL — SIGNIFICANT CHANGE UP (ref 80–100)
MONOCYTES # BLD AUTO: 0.76 K/UL — SIGNIFICANT CHANGE UP (ref 0–0.9)
MONOCYTES NFR BLD AUTO: 7.9 % — SIGNIFICANT CHANGE UP (ref 2–14)
NEUTROPHILS # BLD AUTO: 5.43 K/UL — SIGNIFICANT CHANGE UP (ref 1.8–7.4)
NEUTROPHILS NFR BLD AUTO: 56.5 % — SIGNIFICANT CHANGE UP (ref 43–77)
NRBC # BLD AUTO: 0 K/UL — SIGNIFICANT CHANGE UP (ref 0–0)
NRBC # FLD: 0 K/UL — SIGNIFICANT CHANGE UP (ref 0–0)
NRBC BLD AUTO-RTO: 0 /100 WBCS — SIGNIFICANT CHANGE UP (ref 0–0)
PLATELET # BLD AUTO: 307 K/UL — SIGNIFICANT CHANGE UP (ref 150–400)
PMV BLD: 9.6 FL — SIGNIFICANT CHANGE UP (ref 7–13)
POTASSIUM SERPL-MCNC: 4.4 MMOL/L — SIGNIFICANT CHANGE UP (ref 3.5–5.3)
POTASSIUM SERPL-SCNC: 4.4 MMOL/L — SIGNIFICANT CHANGE UP (ref 3.5–5.3)
PROT SERPL-MCNC: 7.9 GM/DL — SIGNIFICANT CHANGE UP (ref 6–8.3)
RBC # BLD: 4.44 M/UL — SIGNIFICANT CHANGE UP (ref 3.8–5.2)
RBC # FLD: 12.6 % — SIGNIFICANT CHANGE UP (ref 10.3–14.5)
SODIUM SERPL-SCNC: 135 MMOL/L — SIGNIFICANT CHANGE UP (ref 135–145)
TROPONIN I, HIGH SENSITIVITY RESULT: 7.6 NG/L — SIGNIFICANT CHANGE UP
WBC # BLD: 9.62 K/UL — SIGNIFICANT CHANGE UP (ref 3.8–10.5)
WBC # FLD AUTO: 9.62 K/UL — SIGNIFICANT CHANGE UP (ref 3.8–10.5)

## 2025-07-16 PROCEDURE — 84484 ASSAY OF TROPONIN QUANT: CPT

## 2025-07-16 PROCEDURE — 36415 COLL VENOUS BLD VENIPUNCTURE: CPT

## 2025-07-16 PROCEDURE — 84702 CHORIONIC GONADOTROPIN TEST: CPT

## 2025-07-16 PROCEDURE — 71275 CT ANGIOGRAPHY CHEST: CPT | Mod: 26

## 2025-07-16 PROCEDURE — 99285 EMERGENCY DEPT VISIT HI MDM: CPT

## 2025-07-16 PROCEDURE — 85025 COMPLETE CBC W/AUTO DIFF WBC: CPT

## 2025-07-16 PROCEDURE — 93010 ELECTROCARDIOGRAM REPORT: CPT

## 2025-07-16 PROCEDURE — 96374 THER/PROPH/DIAG INJ IV PUSH: CPT | Mod: XU

## 2025-07-16 PROCEDURE — 80053 COMPREHEN METABOLIC PANEL: CPT

## 2025-07-16 PROCEDURE — 99285 EMERGENCY DEPT VISIT HI MDM: CPT | Mod: 25

## 2025-07-16 PROCEDURE — 93005 ELECTROCARDIOGRAM TRACING: CPT

## 2025-07-16 PROCEDURE — 71275 CT ANGIOGRAPHY CHEST: CPT

## 2025-07-16 RX ORDER — KETOROLAC TROMETHAMINE 30 MG/ML
30 INJECTION, SOLUTION INTRAMUSCULAR; INTRAVENOUS ONCE
Refills: 0 | Status: DISCONTINUED | OUTPATIENT
Start: 2025-07-16 | End: 2025-07-16

## 2025-07-16 RX ADMIN — KETOROLAC TROMETHAMINE 30 MILLIGRAM(S): 30 INJECTION, SOLUTION INTRAMUSCULAR; INTRAVENOUS at 18:03

## 2025-07-16 NOTE — ED STATDOCS - PATIENT PORTAL LINK FT
You can access the FollowMyHealth Patient Portal offered by [facility] by registering at the following website: [email or web address]. By joining [employer]’s FollowMyHealth portal, you will also be able to view your health information using other applications (apps) compatible with our system.

## 2025-07-16 NOTE — ED STATDOCS - OBJECTIVE STATEMENT
51yo woman with h/o lymphoma and breast CA pw CP L lateral x past few days.  worse with deep breath.  worse sometimes with movement.  no SOB.  non exertional non positional.  no h/o blood clot.  no new recent physical activity. pt also has a h/o PAF and was worried about her heart.

## 2025-07-16 NOTE — ED ADULT TRIAGE NOTE - HOW PATIENT ADDRESSED, PROFILE
From: Magdaleno Obregon  To: Jass WeslyDO dee  Tommy Trotter
Ana

## 2025-07-16 NOTE — ED STATDOCS - NSFOLLOWUPINSTRUCTIONS_ED_ALL_ED_FT
Please follow-up with your doctor(s) within the next 3 days, but see medical sooner if your symptoms persist or worsen.  Please call tomorrow for an appointment.    Review the hypodensity on your Ct scan with your PCP.  You were given a copy of your labs and/or imaging.  Please go-over these with your doctor(s).     If you have any worsening of symptoms or any other concerns please see your doctor or return to the ED immediately.    Please continue taking your home medications as directed    ANY PENDING RESULTS: You can access the FollowMyHealth Patient Portal offered by [employer] by registering at the following website: [email or web address]. By joining [employer]’s OnLiveMyHealth portal, you will also be able to view your health information using other applications (apps) compatible with our system.

## 2025-07-16 NOTE — ED ADULT TRIAGE NOTE - CHIEF COMPLAINT QUOTE
Pt comes to the ED complaining of chest pain. Pt states that it has been for the past few days but was at work today when the pain intensified and she is unable to take a deep breath. Pt took advil at 10am with relief.

## 2025-07-16 NOTE — ED STATDOCS - ATTENDING APP SHARED VISIT CONTRIBUTION OF CARE
I, June Reis MD,  performed the initial face to face bedside interview with this patient regarding history of present illness, review of symptoms and relevant past medical, social and family history.  I completed an independent physical examination.  I was the initial provider who evaluated this patient.   I personally saw the patient and performed a substantive portion of the visit including all aspects of the medical decision making.  I have signed out the follow up of any pending tests (i.e. labs, radiological studies) to the [facility].  I have communicated the patient’s plan of care and disposition with the [facility].

## 2025-07-16 NOTE — ED STATDOCS - PROGRESS NOTE DETAILS
Kavon: discussed labs and CT with pt. also discussed hypodensity on liver and to fu. Discussed with patient need to return to ED if symptoms don't continue to improve or recur or develops any new or worsening symptoms that are of concern.

## 2025-07-16 NOTE — ED ADULT NURSE NOTE - PATIENT'S SEXUAL ORIENTATION
From: Jadon Russ  To: Montana Flores MD  Sent: 3/16/2020 11:48 AM CDT  Subject: Other    I contacted you when my sugars were above 200 and you increased by Metformin from (2) 500MG tablets per day to 4 tablets per day, because of this I need a new prescription as my current supply is running out. We winter in [place] so I have attached a current A1C and my fasting sugars for the year. I have gone back to two tablets per day because my sugars have come down and are trending down (see attached chart) . I have lost about 10 pounds since they were averaging around 200 and reduced my carbohydrate intake to about 40g per meal.  Thanks   Jadon Russ  [birth year]   Lara

## 2025-07-17 PROBLEM — C50.919 MALIGNANT NEOPLASM OF UNSPECIFIED SITE OF UNSPECIFIED FEMALE BREAST: Chronic | Status: ACTIVE | Noted: 2023-12-10
